# Patient Record
Sex: MALE | Race: WHITE | NOT HISPANIC OR LATINO | Employment: FULL TIME | URBAN - METROPOLITAN AREA
[De-identification: names, ages, dates, MRNs, and addresses within clinical notes are randomized per-mention and may not be internally consistent; named-entity substitution may affect disease eponyms.]

---

## 2024-03-11 ENCOUNTER — APPOINTMENT (OUTPATIENT)
Dept: ORTHOPEDIC SURGERY | Facility: CLINIC | Age: 24
End: 2024-03-11
Payer: COMMERCIAL

## 2024-03-15 ENCOUNTER — OFFICE VISIT (OUTPATIENT)
Dept: ORTHOPEDIC SURGERY | Facility: CLINIC | Age: 24
End: 2024-03-15
Payer: COMMERCIAL

## 2024-03-15 ENCOUNTER — HOSPITAL ENCOUNTER (OUTPATIENT)
Dept: RADIOLOGY | Facility: CLINIC | Age: 24
Discharge: HOME | End: 2024-03-15
Payer: COMMERCIAL

## 2024-03-15 DIAGNOSIS — M79.642 LEFT HAND PAIN: ICD-10-CM

## 2024-03-15 DIAGNOSIS — S62.012A DISPLACED FRACTURE OF DISTAL POLE OF NAVICULAR (SCAPHOID) BONE OF LEFT WRIST, INITIAL ENCOUNTER FOR CLOSED FRACTURE: ICD-10-CM

## 2024-03-15 PROCEDURE — 99214 OFFICE O/P EST MOD 30 MIN: CPT | Performed by: STUDENT IN AN ORGANIZED HEALTH CARE EDUCATION/TRAINING PROGRAM

## 2024-03-15 PROCEDURE — 2500000004 HC RX 250 GENERAL PHARMACY W/ HCPCS (ALT 636 FOR OP/ED): Performed by: STUDENT IN AN ORGANIZED HEALTH CARE EDUCATION/TRAINING PROGRAM

## 2024-03-15 PROCEDURE — 73130 X-RAY EXAM OF HAND: CPT | Mod: LEFT SIDE | Performed by: STUDENT IN AN ORGANIZED HEALTH CARE EDUCATION/TRAINING PROGRAM

## 2024-03-15 PROCEDURE — 2500000005 HC RX 250 GENERAL PHARMACY W/O HCPCS: Performed by: STUDENT IN AN ORGANIZED HEALTH CARE EDUCATION/TRAINING PROGRAM

## 2024-03-15 PROCEDURE — 20550 NJX 1 TENDON SHEATH/LIGAMENT: CPT | Performed by: STUDENT IN AN ORGANIZED HEALTH CARE EDUCATION/TRAINING PROGRAM

## 2024-03-15 PROCEDURE — 99204 OFFICE O/P NEW MOD 45 MIN: CPT | Performed by: STUDENT IN AN ORGANIZED HEALTH CARE EDUCATION/TRAINING PROGRAM

## 2024-03-15 PROCEDURE — 73130 X-RAY EXAM OF HAND: CPT | Mod: LT

## 2024-03-15 RX ORDER — LIDOCAINE HYDROCHLORIDE 10 MG/ML
0.5 INJECTION INFILTRATION; PERINEURAL
Status: COMPLETED | OUTPATIENT
Start: 2024-03-15 | End: 2024-03-15

## 2024-03-15 RX ADMIN — LIDOCAINE HYDROCHLORIDE 0.5 ML: 10 INJECTION, SOLUTION INFILTRATION; PERINEURAL at 16:16

## 2024-03-15 RX ADMIN — TRIAMCINOLONE ACETONIDE 5 MG: 10 INJECTION, SUSPENSION INTRA-ARTICULAR; INTRALESIONAL at 16:16

## 2024-03-15 NOTE — PROGRESS NOTES
History of Present Illness:  Presents for evaluation of left hand pain.  He sustained scaphoid fracture 2 and half months ago after fall on outstretched hand.  This was found on MRI 2 months ago-he is trying to get copy of CD for us..  He has been in a brace until 1 week ago.  Presents for second opinion.  He does have some radial sided continued pain but today complains more ulnar-sided pain.  Particularly over the base of the fifth metacarpal.  This radiates up the ulnar side of his arm.  Denies numbness or tingling throughout the hand.     Review of Systems   GENERAL: Negative for malaise, significant weight loss, fever  MUSCULOSKELETAL: see HPI  NEURO:  Negative    The patient's past medical history, family history, social history, and review of systems were reviewed. History is otherwise negative except as stated in the HPI.    Physical Examination:  General: Alert and oriented to person, place, and time.  No acute distress and breathing comfortably: Pleasant and cooperative with examination.  HEENT: Head is normocephalic and atraumatic.  Neck: Supple, no visible swelling.  Cardiovascular: No palpable tachycardia  Lungs: No audible wheezing or labored breathing  Abdomen: Nondistended.    On musculoskeletal examination,   Able to make a full fist.  Able to extend all of his digits.  No tenderness palpation over scaphoid tubercle.  Mild tenderness palpation over the snuffbox.  No tenderness over the CMC or remainder of bony prominences.  He does have tenderness palpation over the ECU insertion.  No tenderness over the fovea.  No ulnar ballottement.  No tenderness over triquetrum.  No tenderness over the pisiform.  Sensation tact to light touch throughout.    Imaging:  Radiographic notes: AP lateral and oblique of the hand demonstrates no acute osseous process.  Unable to appreciate significant callus formation in the scaphoid.  Unable to appreciate prior fracture.    Hand / UE Inj/Asp: L extensor compartment 6  for tendonitis on 3/15/2024 4:16 PM  Indications: pain  Details: 24 G needle, volar approach  Medications: 5 mg triamcinolone acetonide 10 mg/mL; 0.5 mL lidocaine 10 mg/mL (1 %)  Procedure, treatment alternatives, risks and benefits explained, specific risks discussed. Consent was given by the patient. Immediately prior to procedure a time out was called to verify the correct patient, procedure, equipment, support staff and site/side marked as required.             Assessment:  Patient with healed left scaphoid fracture on x-ray.  He is trying to maintain copy of MRI for us to evaluate.  Today complaining of ulnar-sided pain and ECU tendinitis like symptoms.  Discussed this at length.  He is interested in an injection today.    Plan:  Injection.  I explained the risks and benefits of an injection. Specifically, I reviewed the risks of injection, which include, but are not limited to, infection, bleeding, nerve injury, pain, steroid flare, glycemic alteration, subcutaneous fat atrophy, skin hypopigmentation, soft tissue damage, and incomplete symptom relief. At this time, the patient would like to proceed with an injection. After obtaining consent, I injected a 1mL combination of Kenalog and 1% lidocaine into left 6 dorsal compartment, sterile technique. The injection site was dressed, and the patient tolerated the injection well. I am hopeful that this injection will serve diagnostic, prognostic, and therapeutic purposes. Finally, I have emphasized patience, as any benefit may take several weeks to manifest. Depending on the success of the injection, I will see them back 1 month            Follow up: 1 month    Sonia Dowd MD

## 2024-04-03 ENCOUNTER — APPOINTMENT (OUTPATIENT)
Dept: ORTHOPEDIC SURGERY | Facility: CLINIC | Age: 24
End: 2024-04-03
Payer: COMMERCIAL

## 2024-04-12 ENCOUNTER — APPOINTMENT (OUTPATIENT)
Dept: ORTHOPEDIC SURGERY | Facility: CLINIC | Age: 24
End: 2024-04-12
Payer: COMMERCIAL

## 2024-04-15 ENCOUNTER — APPOINTMENT (OUTPATIENT)
Dept: ORTHOPEDIC SURGERY | Facility: CLINIC | Age: 24
End: 2024-04-15
Payer: COMMERCIAL

## 2024-04-22 ENCOUNTER — OFFICE VISIT (OUTPATIENT)
Dept: ORTHOPEDIC SURGERY | Facility: CLINIC | Age: 24
End: 2024-04-22
Payer: COMMERCIAL

## 2024-04-22 ENCOUNTER — HOSPITAL ENCOUNTER (OUTPATIENT)
Dept: RADIOLOGY | Facility: CLINIC | Age: 24
Discharge: HOME | End: 2024-04-22
Payer: COMMERCIAL

## 2024-04-22 DIAGNOSIS — S62.012A DISPLACED FRACTURE OF DISTAL POLE OF NAVICULAR (SCAPHOID) BONE OF LEFT WRIST, INITIAL ENCOUNTER FOR CLOSED FRACTURE: Primary | ICD-10-CM

## 2024-04-22 DIAGNOSIS — S62.012A DISPLACED FRACTURE OF DISTAL POLE OF NAVICULAR (SCAPHOID) BONE OF LEFT WRIST, INITIAL ENCOUNTER FOR CLOSED FRACTURE: ICD-10-CM

## 2024-04-22 PROCEDURE — 99213 OFFICE O/P EST LOW 20 MIN: CPT | Performed by: STUDENT IN AN ORGANIZED HEALTH CARE EDUCATION/TRAINING PROGRAM

## 2024-04-22 PROCEDURE — 73110 X-RAY EXAM OF WRIST: CPT | Mod: LEFT SIDE | Performed by: STUDENT IN AN ORGANIZED HEALTH CARE EDUCATION/TRAINING PROGRAM

## 2024-04-22 PROCEDURE — 73110 X-RAY EXAM OF WRIST: CPT | Mod: LT

## 2024-04-22 NOTE — PROGRESS NOTES
History of Present Illness  Patient returns today for evaluation of left wrist and hand pain.  Originally sustained scaphoid fracture in January and was immobilized for approximately 2 months.  MRI from February revealed nondisplaced distal scaphoid fracture.  We have report but unfortunately unable to view scans.  Previously seen a month ago with ulnar-sided pain.  Received injection into the ECU with improvement.  Now complaining of persistent radial sided pain..     Physical Examination:  Left upper extremity:  The patient appears to be their stated age, is in no apparent distress, and is oriented x3. The patients mood and affect are appropriate. The patients gait is normal. The examination of the limb in question was performed in comparison to the contralateral limb.    On musculoskeletal examination, tenderness to palpation over the snuffbox and scaphoid tubercle    Sensation and motor function are intact in the radial, and median nerve distribution. There is no obvious thenar atrophy, and thenar strength is 5/5. There is no intrinsic atrophy, and intrinsic strength is 5/5.  The patient can make a full composite fist. The hand itself is warm and well perfused. The skin is intact throughout. The contralateral hand/wrist are normal to inspection, range of motion, stability, and strength.    Radiographs  Demonstrate a left wrist with sclerotic lesion in the waist and possible cyst formation.  Unable to appreciate significant callus formation in the scaphoid or prior fracture.    Assessment:  Patient with persistent left wrist pain following scaphoid fracture in January.    Plan:   I would like to get a CAT scan to evaluate healing of his scaphoid.  Discussed that with pain this far out we need to rule out a nonunion.  I will see him back after CAT scan.    Sonia Diehl MD

## 2024-05-07 ENCOUNTER — APPOINTMENT (OUTPATIENT)
Dept: RADIOLOGY | Facility: CLINIC | Age: 24
End: 2024-05-07
Payer: COMMERCIAL

## 2024-05-10 ENCOUNTER — HOSPITAL ENCOUNTER (OUTPATIENT)
Dept: RADIOLOGY | Facility: CLINIC | Age: 24
Discharge: HOME | End: 2024-05-10
Payer: COMMERCIAL

## 2024-05-10 DIAGNOSIS — S62.012A DISPLACED FRACTURE OF DISTAL POLE OF NAVICULAR (SCAPHOID) BONE OF LEFT WRIST, INITIAL ENCOUNTER FOR CLOSED FRACTURE: ICD-10-CM

## 2024-05-10 PROCEDURE — 73200 CT UPPER EXTREMITY W/O DYE: CPT | Mod: LEFT SIDE | Performed by: RADIOLOGY

## 2024-05-10 PROCEDURE — 73200 CT UPPER EXTREMITY W/O DYE: CPT | Mod: LT

## 2024-05-17 ENCOUNTER — APPOINTMENT (OUTPATIENT)
Dept: RADIOLOGY | Facility: CLINIC | Age: 24
End: 2024-05-17
Payer: COMMERCIAL

## 2024-05-21 ENCOUNTER — OFFICE VISIT (OUTPATIENT)
Dept: ORTHOPEDIC SURGERY | Facility: CLINIC | Age: 24
End: 2024-05-21
Payer: COMMERCIAL

## 2024-05-21 DIAGNOSIS — M25.532 LEFT WRIST PAIN: Primary | ICD-10-CM

## 2024-05-21 PROBLEM — Q74.0 OTHER CONGENITAL MALFORMATIONS OF UPPER LIMB(S), INCLUDING SHOULDER GIRDLE: Status: ACTIVE | Noted: 2024-05-21

## 2024-05-21 PROCEDURE — 99214 OFFICE O/P EST MOD 30 MIN: CPT | Performed by: STUDENT IN AN ORGANIZED HEALTH CARE EDUCATION/TRAINING PROGRAM

## 2024-05-21 NOTE — PROGRESS NOTES
History of Present Illness  Patient returns today for evaluation of left wrist.  Did obtain CAT scan in the interim which shows an accessory ossicle present dorsally between the scaphoid capitate and trapezoid.  Interestingly this is in location of where prior distal scaphoid fracture was previously seen.  Unfortunately we are still unable to see MRI disc.    States that pain today is mostly radially based and worse with wrist extension.  He wears a wrist brace intermittently without considerable improvement    Physical Examination:  Left upper extremity:  The patient appears to be their stated age, is in no apparent distress, and is oriented x3. The patients mood and affect are appropriate. The patients gait is normal. The examination of the limb in question was performed in comparison to the contralateral limb.    On musculoskeletal examination, tenderness to palpation over the distal scaphoid.  This appears to correlate to his ossicle present between the scaphoid capitate and trapezoid.    Sensation and motor function are intact in the radial, and median nerve distribution. There is no obvious thenar atrophy, and thenar strength is 5/5. There is no intrinsic atrophy, and intrinsic strength is 5/5.  The patient can make a full composite fist. The hand itself is warm and well perfused. The skin is intact throughout. The contralateral hand/wrist are normal to inspection, range of motion, stability, and strength.    Ctn Bony structures:  An approximate 6 mm ossification is seen dorsally  between the scaphoid, capitate and trapezoid, best on image 21 of  series 205. This is felt to be due the accessory ossicle, os centrale  carpi as the cortical margins of the bony structures are well  defined. The bony structures otherwise appear intact without acute  fracture.    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  Interesting in that this fall in January could have caused inflammation surrounding  the ossicle.  CT without evidence of prior scaphoid fracture.  It is possible that this was previously asymptomatic but with fall became symptomatic.  We discussed options.  We could perform cortisone injection around the area.  As this looks like an accessory ossicle I believe this is present prior to the fall.  A cortisone injection could help us reduce inflammation and reduce pain.  He is very symptomatic and now knowing that this is here would like this to be removed.  We discussed risk benefits and alternatives of excision and he would like to proceed with excision.  Discussed that following this we would place him into a volar resting brace for 4 weeks and then get him into occupational therapy.    Plan:   At this point, I discussed the risks/benefits/expected outcomes of the three treatment options: Injection versus observation vs surgery. The patient has elected to proceed with surgery. The risks/benefits of surgery were reviewed. The risks include, but are not limited to, infection, bleeding, nerve/vessel injury, recurrence, stiffness, and anaesthetic complications. I have answered all questions regarding the surgery itself and the post-operative course. The patient consented to surgery and will be scheduled in the near future.      Sonia Diehl MD

## 2024-05-29 NOTE — PREPROCEDURE INSTRUCTIONS
Patient and nurse discussed pre-operative instructions of the location of procedure, NPO status, home medications, and what to aspect after procedure.  Patient is aware to not smoke nicotine products, drink alcohol, or do any drugs within 24hrs of procedure.  Not to bring any valuables and to not wear any metal, jewelry, piercing's or beauty products for surgery.   Informed about the call the business day prior to procedure that will be give the exact time of arrival on the day of surgery, between 2PM-4PM.  Any questions call the surgeons office, and any questions about Pre-Admission Testing call [phone]

## 2024-05-30 ENCOUNTER — ANESTHESIA EVENT (OUTPATIENT)
Dept: OPERATING ROOM | Facility: HOSPITAL | Age: 24
End: 2024-05-30
Payer: COMMERCIAL

## 2024-05-30 RX ORDER — OXYCODONE AND ACETAMINOPHEN 5; 325 MG/1; MG/1
1 TABLET ORAL EVERY 6 HOURS PRN
Qty: 12 TABLET | Refills: 0 | Status: SHIPPED | OUTPATIENT
Start: 2024-05-30 | End: 2024-06-04

## 2024-05-30 RX ORDER — IBUPROFEN 800 MG/1
800 TABLET ORAL 3 TIMES DAILY
Qty: 15 TABLET | Refills: 0 | Status: SHIPPED | OUTPATIENT
Start: 2024-05-30 | End: 2024-06-04

## 2024-05-30 NOTE — DISCHARGE INSTRUCTIONS
Moderate Sedation in Adults Discharge Instructions    About this topic  Moderate sedation is also known as conscious sedation. It changes your state of being awake or consciousness. With this sedation, you may feel slight pain or pressure during a procedure. The drugs help you to relax and may even allow you to sleep. It will be easy to wake you and you may talk and answer questions while under sedation. Most likely, you will not remember what happens while under this sedation.  What care is needed at home?  Ask your doctor what you need to do when you go home. Make sure you understand everything the doctor says. This way you will know what you need to do.  You will not be allowed to drive right away after the procedure. Ask a family member or a friend to drive you home.  Do not operate heavy or dangerous machinery for at least 24 hours.  Do not make major decisions or sign important papers for at least 24 hours. You may not be thinking clearly.  Avoid beer, wine, or mixed drinks (alcohol) for at least 24 hours.  You are at a higher risk of falling for at least 24 hours after moderate sedation.  Take extra care when you get up.  Do not change positions quickly.  Do not rush when you need to go to the bathroom or to answer the phone.  Ask for help if you feel unsteady when you try to walk.  Wear shoes with non-slip soles and low heels.  What follow-up care is needed?  Your doctor may ask you to make visits to the office to check on your progress. Be sure to keep these visits. Your doctor may also refer you to other doctors or tell you that you need more tests or care.  What drugs may be needed?  The doctor may order drugs to:  Help with pain  Treat an upset stomach or throwing up  Will physical activity be limited?  Rest for the day of the procedure. Avoid strenuous activities like heavy lifting and hard exercise. Talk to your doctor about whether you need to limit lifting or exercise after your procedure.  What  changes to diet are needed?  Start with a light diet when you are fully awake. This includes things that are easy to swallow like soups, pudding, jello, toast, and eggs. Slowly progress to your normal diet.  What problems could happen?  Low blood pressure  Breathing problems  Upset stomach or throwing up  Dizziness  When do I need to call the doctor?  Feel dizzy, weak, or tired  Faint  Very bad headache  Upset stomach or throwing up  To follow up for more tests or care  Teach Back: Helping You Understand  The Teach Back Method helps you understand the information we are giving you. After you talk with the staff, tell them in your own words what you learned. This helps to make sure the staff has described each thing clearly. It also helps to explain things that may have been confusing. Before going home, make sure you can do these:  I can tell you about my procedure.  I can tell you if I need more tests or care.  I can tell you what is good for me to eat and drink the next day.  I can tell you what I would do if I feel dizzy, weak, or tired.  Last Reviewed Date  2020-03-02     Nerve Blocks    Why is this procedure done?  Nerve blocks can help to manage pain. By giving you a drug into an exact group of nerves, your doctor may be able to block pain to a specific part of your body. Some nerve blocks are used after surgery, especially if you have had an abdominal surgery. Nerve blocks are used before surgery to lessen the need for opioid drugs during and after surgery.  There are a few kinds of nerve blocks. Some nerve blocks are used to:  Treat pain. These may have a pain drug and a drug to help with swelling.  Find where your pain is coming from. This kind of nerve block will have a pain drug that lasts for only a certain amount of time.  See if another kind of treatment like surgery will help your pain.  Prevent pain during or after a procedure.  Help you avoid surgery.  Give relief of pain during and after  surgery.  Lessen the use of opioid drugs needed for pain after surgery.  Block the pain in an area of the body during surgery as anesthesia.  What will the results be?  The nerve block may help to treat or ease your pain. The area may be numb. You may have some pain relief right away. You may be able to use fewer pain medicines after surgery. It also may be easier for you to move around after surgery. Some nerve blocks go away within a few hours. Others give you pain relief for a day or so to a few months or longer. Some nerve blocks can take a few days to work fully.  What happens before the procedure?  Your doctor will ask you about your health history. Talk to the doctor about:  All the drugs you are taking. Be sure to include all prescription, over the counter, and herbal supplements. Tell the doctor if you have any drug allergy. Bring a list of drugs you take with you.  Any bleeding problems. Be sure to tell your doctor if you are taking any drugs that may cause bleeding. Some of these are warfarin, rivaroxaban, apixaban, ticagrelor, clopidogrel, ibuprofen, naproxen, or aspirin. Certain vitamins and herbs, such as garlic and fish oil, may also add to the risk for bleeding. You may need to stop these drugs as well. Talk to your doctor about them.  What happens during the procedure?  Sometimes, the doctor will give you a special drug to make you sleepy for the nerve block. Other times, you are completely awake. You may also have a nerve block as a part of your surgery.  The doctor will position you in a way to give them easy access to where you will be having the nerve block.  The doctor will clean the area and give you a local numbing drug. The doctor will use a long thin needle to give you the nerve block. Often, the doctor will use a special x-ray, ultrasound, or CT scan to make sure the needle is in the right place. The doctor will inject the drug close to the nerve that is causing your pain. Sometimes they  inject the drug in an area that will block pain from a few nerves.  The doctor will take out the needle and place a clean bandage on your skin.  Sometimes, the doctor may leave a catheter in place to deliver medicine over 1 to 2 days. You may have to return to your doctor's office to have the catheter removed.  The procedure takes 15 to 30 minutes.  What happens after the procedure?  If you are not having surgery, you will be able to go home the same day. You may be asked to rest for 15 to 30 minutes. If the doctor gives you a special drug to make you sleepy for the procedure, you will need someone to drive you home.  What care is needed at home?  You will be allowed to shower or take a bath later that same day unless you have a catheter still in place.  You may need other medicines to help with pain as your nerve block wears off.  What follow-up care is needed?  As your body absorbs the drugs, your pain may come back. Talk to your doctor about if you need another nerve block and how often you can have a nerve block.  What problems could happen?  Infection  Bleeding or bruising  Pain at the injection site  Raised blood sugar  Rash  Itching  Numbness  Nerve injury  Allergic reaction  Puncture or laceration of an organ like the liver, spleen. or bowel  Numbing medicine injected into a blood vessel  Where can I learn more?  American Society of Regional Anesthesia  https://www.kathleen.com/patient-information/regional-anesthesia  NHS  https://www.nhs.uk/conditions/epidural/  [facility]  https://www.nhs.uk/conditions/local-anaesthesia/  [employer] Society of [place]  http://www.radiologyinfo.org/en/info.cfm?pg=nerveblock  Last Reviewed Date  2020-04-22    Dr. Diehl Upper Extremity SURGERY  Post Operative Instructions   Sonia Diehl MD     Dressing  You have a bulky dressing on your arm.        Do NOT remove dressing until next appointment with Dr. Diehl or Occupational Therapy (OT). Please call Dr. Diehl's office if  an appointment is not already arranged.  __________________________________________________________________________________________________________    If you experience persistent numbness, tingling or burning sensation in your hand, it may be due to a cast or surgical dressing that is too tight. Keep your hand elevated, and if this does not resolve the problem, call your doctor immediately.    If your dressing, splint, or cast gets wet, contact your physician’s office.     Activity  If pain will allow, try to flex and extend the fingers on the operated hand.  The dressing and swelling may cause the fingers to be stiff and this is common.  If the fingers turn blue, purple, or remain numb after the block has worn off, call the office immediately.      Showering  You may shower as soon as you want after surgery. Please cover the dressing with a bag.    If you are allowed to remove your dressing, you may shower and get the wound wet after you have been told it is safe to remove your dressing.  You may allow the water to run over the incisions and pat the incision dry. DO NOT let the wound soak in a tub, pool, or dish water. If you are using a band aid to cover your incision after a shower, make sure the incision is completely dry.    Ice & Elevate  The use of ice on your arm/hand after surgery will help with both pain control and swelling.  Continue with icing your arm/hand for the first 48 hours after surgery.  It can take a while for the coolness of the ice to get through the splint/cast, but be patient, it will work. Thereafter, use it on an as needed basis.    Elevate your hand above your heart level as much as possible for the first 48 hours, even while walking.  This will keep the swelling to a minimum and prevent throbbing and pain. Elevation reduces swelling and minimizes pain. Less swelling is associated with a lower infection rate, fewer wound complications, less post-operative stiffness, and more rapid  recovery of function. To keep the swelling down, your hand must be kept above the level of your heart.     One common mistake people make is they will put pillows or blankets under their elbow while sitting or laying down. Please always keep the hand above the elbow and move your fingers as much as possible! Swelling tends to collect in the lowest part of the body so if your hand is below the rest of your arm, your fingers and hand will swell and become stiff.    Pain Medication  If you received a regional anesthetic block your arm and hand may be numb for several hours (6-24 hours).  You will be discharged from the surgery center to home with an oral pain medication (analgesic).  Rest and elevation is still one of the most important factors for pain control. Take your pain medication as directed even though the pain is minimal with the block; do not wait for the pain to become out of control.  The most severe pain occurs as the block wears off.  Even if you are not having pain but feel the arm and hand “waking up”, take your pain medication so that it will be working when needed.    DO NOT drink alcoholic beverages or smoke while taking your pain medications. DO NOT drive a car or other vehicle or operate any machinery while under the influence of your medication.    It is important NOT TO WAIT until your pain is severe before taking your medication since the medications often take an hour or longer before you will begin to feel some relief. Take the medication as soon as you experience even mild pain the first night after surgery. Usually by the second or third day after surgery your upper extremity will begin to feel better and you will require much less pain medication.    **Prescription refills will only be addressed during normal business hours.  Narcotic refills will not be addressed after hours or on weekends as the physician on call does not have access to your medical records.**    Side Effects: All pain  medications can cause nausea, vomiting, and/or constipation. It is best to take pain medication with food. If these problems become significant, please contact our office. Have a phone number for your pharmacy available.    Diet  Eat light the day of surgery and resume normal diet tomorrow. Increase your fluid intake due to pain medications    Driving  It is not advisable to drive a vehicle while you are on pain medication, due to the possible side effects.  However, once you are off pain medication and you feel that you are able to safely control the vehicle, you may drive.    Warning Signs  Fever: A low-grade fever (less than 101 degrees) following surgery and lasting for several days is quite common. You may even have some slight chills and sweating. If you have a low-grade fever you should make an effort to cough and breathe deeply to clear congestion from your lungs.     After hospital discharge, call your physician if you experience:  Increasing or foul smelling drainage (after the first 24 hours)   Increasing redness and/or pain to surgery site.  As well as new onset fevers and or chills.  These could signify an infection.    If any of the above occurs, please notify Dr. Diehl's office.      Follow-up Appointments    We are interested in your prompt and complete recovery from surgery. If you have any problems or questions concerning your recovery, please call your doctor’s office.  Your doctor’s office can be called Monday --Friday, 8:00 am to 5:00 pm.     You should already have an appointment to see Dr. Diehl within the next few weeks.  If you do not have this appointment, or need to change your appointment time, please contact our office.     Do not hesitate to call with any questions or concerns.      Dr. Diehl's office numbers are:    1) During normal business hours, 8AM to 5PM Monday through Friday, please call: [phone]  2) After hour emergencies can be directed to the physician on call at  [phone]                                        Frequently Asked Questions    Patients often have similar questions after surgery. To help reduce unnecessary worry and stress after your surgery, we have answered some of your commonly asked questions.    What should I do if I see blood on my bandages?  Many patients bleed through their bandages after surgery. Do not let this alarm you. Please do not remove the initial bandage since this it is sterile and we want to maintain cleanliness around the wound until we change the dressing.    When can I shower or bathe?  Often we will keep a snug compression bandage on your arm for several weeks after surgery. You should not get this bandage wet. If the bandage should become wet, it will need to be changed. Keep your arm dry until the bandage is removed for good. Therefore, sponge baths are the recommended body cleansing method. Information regarding shower cast protectors is available at your surgeon’s office. Glad brand Press'n Seal is also a good, lower cost option for keeping your dressing dry while bathing.    What should I do if the bandages come off?  The bandages are placed in a very specific fashion. If the bandages are removed or come off, please place a sterile gauze wrap over the incisions. We should see you in the office the following day to replace your bandage.    When will the numbness that I feel in my hand wear off?  We usually numb your arm during surgery. Sometimes it takes up to 24 hours for the numbness to go away. You may continue to have some numbness in your fingers after this time because the nerves can be slightly bruised during surgery.    What do I do about swelling around my fingers and behind my bandage?  All patients have a significant amount of swelling around their bandage and hand after surgery. This swelling will last for several months. After surgery you should elevate your hand higher than your heart to decrease swelling. This is  particularly critical for several days after surgery while your incision is healing.  Once the wound is healed, the swelling will not harm your surgery. If you are concerned about a significant amount of swelling or redness, please call for an appointment. You should also try to move your free fingers (not those in the dressing) as much as possible to avoid stiffness and swelling.    How can I tell if an infection is developing in my upper extremity?  Many patients will have a slight drainage of yellowish fluid for 1 to 2 weeks after surgery. This does not mean that your hand/arm is infected. Severe wound infections usually occur from 5 to 10 days after surgery. If you notice extreme swelling, increased pain, or extreme redness, please contact us immediately. Usually if we treat an infection early with antibiotics, future surgery can be avoided.    What should I do if I experience nausea or vomiting due to the pain medications I am taking?  Many patients have nausea after surgery when taking pain medications. If the nausea and vomiting are severe, we will need to prescribe medication in an oral dissolving or suppository form.    When will the stitches be removed?  We usually remove the stitches at approximately 1 to 2 weeks after surgery. If you miss your appointment because of an emergency, do not be alarmed because the stitches can remain in place for many weeks without causing harm.    My arm itches under the cast/splint, is there anything I can do?  Whatever you do, do NOT stick any objects under your dressing to scratch your arm! I have seen pen caps and other objects get stuck under the cast and the patient is too embarrassed to come in, so we discover the pen cap or other items under the cast a few weeks later and it has dug into the skin making an ulcer/wound in the patient's skin. Using ice packs on your arm (the cold takes a long time to penetrate the thick dressing) or a hair dryer set to the cool setting  to blow cool air down the splint/cast to help alleviate itching.

## 2024-05-31 ENCOUNTER — ANESTHESIA (OUTPATIENT)
Dept: OPERATING ROOM | Facility: HOSPITAL | Age: 24
End: 2024-05-31
Payer: COMMERCIAL

## 2024-05-31 ENCOUNTER — APPOINTMENT (OUTPATIENT)
Dept: RADIOLOGY | Facility: HOSPITAL | Age: 24
End: 2024-05-31
Payer: COMMERCIAL

## 2024-05-31 ENCOUNTER — HOSPITAL ENCOUNTER (OUTPATIENT)
Facility: HOSPITAL | Age: 24
Setting detail: OUTPATIENT SURGERY
Discharge: HOME | End: 2024-05-31
Attending: STUDENT IN AN ORGANIZED HEALTH CARE EDUCATION/TRAINING PROGRAM | Admitting: STUDENT IN AN ORGANIZED HEALTH CARE EDUCATION/TRAINING PROGRAM
Payer: COMMERCIAL

## 2024-05-31 VITALS
WEIGHT: 164.68 LBS | SYSTOLIC BLOOD PRESSURE: 128 MMHG | TEMPERATURE: 97 F | OXYGEN SATURATION: 99 % | DIASTOLIC BLOOD PRESSURE: 74 MMHG | RESPIRATION RATE: 16 BRPM | HEART RATE: 73 BPM | HEIGHT: 69 IN | BODY MASS INDEX: 24.39 KG/M2

## 2024-05-31 DIAGNOSIS — Q74.0 OTHER CONGENITAL MALFORMATIONS OF UPPER LIMB(S), INCLUDING SHOULDER GIRDLE: Primary | ICD-10-CM

## 2024-05-31 PROCEDURE — A4217 STERILE WATER/SALINE, 500 ML: HCPCS | Performed by: STUDENT IN AN ORGANIZED HEALTH CARE EDUCATION/TRAINING PROGRAM

## 2024-05-31 PROCEDURE — 3600000009 HC OR TIME - EACH INCREMENTAL 1 MINUTE - PROCEDURE LEVEL FOUR: Performed by: STUDENT IN AN ORGANIZED HEALTH CARE EDUCATION/TRAINING PROGRAM

## 2024-05-31 PROCEDURE — 2500000004 HC RX 250 GENERAL PHARMACY W/ HCPCS (ALT 636 FOR OP/ED): Performed by: ANESTHESIOLOGY

## 2024-05-31 PROCEDURE — 2500000004 HC RX 250 GENERAL PHARMACY W/ HCPCS (ALT 636 FOR OP/ED): Performed by: STUDENT IN AN ORGANIZED HEALTH CARE EDUCATION/TRAINING PROGRAM

## 2024-05-31 PROCEDURE — 2500000004 HC RX 250 GENERAL PHARMACY W/ HCPCS (ALT 636 FOR OP/ED): Mod: JZ | Performed by: STUDENT IN AN ORGANIZED HEALTH CARE EDUCATION/TRAINING PROGRAM

## 2024-05-31 PROCEDURE — 3700000001 HC GENERAL ANESTHESIA TIME - INITIAL BASE CHARGE: Performed by: STUDENT IN AN ORGANIZED HEALTH CARE EDUCATION/TRAINING PROGRAM

## 2024-05-31 PROCEDURE — 25210 REMOVAL OF WRIST BONE: CPT | Performed by: STUDENT IN AN ORGANIZED HEALTH CARE EDUCATION/TRAINING PROGRAM

## 2024-05-31 PROCEDURE — 2500000005 HC RX 250 GENERAL PHARMACY W/O HCPCS: Performed by: NURSE ANESTHETIST, CERTIFIED REGISTERED

## 2024-05-31 PROCEDURE — 3600000004 HC OR TIME - INITIAL BASE CHARGE - PROCEDURE LEVEL FOUR: Performed by: STUDENT IN AN ORGANIZED HEALTH CARE EDUCATION/TRAINING PROGRAM

## 2024-05-31 PROCEDURE — 2500000004 HC RX 250 GENERAL PHARMACY W/ HCPCS (ALT 636 FOR OP/ED): Performed by: NURSE ANESTHETIST, CERTIFIED REGISTERED

## 2024-05-31 PROCEDURE — 7100000009 HC PHASE TWO TIME - INITIAL BASE CHARGE: Performed by: STUDENT IN AN ORGANIZED HEALTH CARE EDUCATION/TRAINING PROGRAM

## 2024-05-31 PROCEDURE — 7100000010 HC PHASE TWO TIME - EACH INCREMENTAL 1 MINUTE: Performed by: STUDENT IN AN ORGANIZED HEALTH CARE EDUCATION/TRAINING PROGRAM

## 2024-05-31 PROCEDURE — 3700000002 HC GENERAL ANESTHESIA TIME - EACH INCREMENTAL 1 MINUTE: Performed by: STUDENT IN AN ORGANIZED HEALTH CARE EDUCATION/TRAINING PROGRAM

## 2024-05-31 RX ORDER — LABETALOL HYDROCHLORIDE 5 MG/ML
5 INJECTION, SOLUTION INTRAVENOUS ONCE AS NEEDED
Status: CANCELLED | OUTPATIENT
Start: 2024-05-31

## 2024-05-31 RX ORDER — FENTANYL CITRATE 50 UG/ML
INJECTION, SOLUTION INTRAMUSCULAR; INTRAVENOUS AS NEEDED
Status: DISCONTINUED | OUTPATIENT
Start: 2024-05-31 | End: 2024-05-31

## 2024-05-31 RX ORDER — MIDAZOLAM HYDROCHLORIDE 1 MG/ML
INJECTION, SOLUTION INTRAMUSCULAR; INTRAVENOUS AS NEEDED
Status: DISCONTINUED | OUTPATIENT
Start: 2024-05-31 | End: 2024-05-31

## 2024-05-31 RX ORDER — PROPOFOL 10 MG/ML
INJECTION, EMULSION INTRAVENOUS AS NEEDED
Status: DISCONTINUED | OUTPATIENT
Start: 2024-05-31 | End: 2024-05-31

## 2024-05-31 RX ORDER — MIDAZOLAM HYDROCHLORIDE 1 MG/ML
1 INJECTION, SOLUTION INTRAMUSCULAR; INTRAVENOUS ONCE AS NEEDED
Status: CANCELLED | OUTPATIENT
Start: 2024-05-31

## 2024-05-31 RX ORDER — LIDOCAINE HYDROCHLORIDE 10 MG/ML
0.1 INJECTION, SOLUTION EPIDURAL; INFILTRATION; INTRACAUDAL; PERINEURAL ONCE
Status: CANCELLED | OUTPATIENT
Start: 2024-05-31 | End: 2024-05-31

## 2024-05-31 RX ORDER — SODIUM CHLORIDE, SODIUM LACTATE, POTASSIUM CHLORIDE, CALCIUM CHLORIDE 600; 310; 30; 20 MG/100ML; MG/100ML; MG/100ML; MG/100ML
100 INJECTION, SOLUTION INTRAVENOUS CONTINUOUS
Status: CANCELLED | OUTPATIENT
Start: 2024-05-31

## 2024-05-31 RX ORDER — LIDOCAINE HYDROCHLORIDE 20 MG/ML
INJECTION, SOLUTION EPIDURAL; INFILTRATION; INTRACAUDAL; PERINEURAL AS NEEDED
Status: DISCONTINUED | OUTPATIENT
Start: 2024-05-31 | End: 2024-05-31

## 2024-05-31 RX ORDER — ALBUTEROL SULFATE 0.83 MG/ML
2.5 SOLUTION RESPIRATORY (INHALATION) ONCE
Status: CANCELLED | OUTPATIENT
Start: 2024-05-31 | End: 2024-05-31

## 2024-05-31 RX ORDER — SODIUM CHLORIDE 0.9 G/100ML
IRRIGANT IRRIGATION AS NEEDED
Status: DISCONTINUED | OUTPATIENT
Start: 2024-05-31 | End: 2024-05-31 | Stop reason: HOSPADM

## 2024-05-31 RX ORDER — OXYCODONE HYDROCHLORIDE 5 MG/1
5 TABLET ORAL EVERY 4 HOURS PRN
Status: CANCELLED | OUTPATIENT
Start: 2024-05-31

## 2024-05-31 RX ORDER — DROPERIDOL 2.5 MG/ML
0.62 INJECTION, SOLUTION INTRAMUSCULAR; INTRAVENOUS ONCE AS NEEDED
Status: CANCELLED | OUTPATIENT
Start: 2024-05-31

## 2024-05-31 RX ORDER — MEPERIDINE HYDROCHLORIDE 25 MG/ML
12.5 INJECTION INTRAMUSCULAR; INTRAVENOUS; SUBCUTANEOUS EVERY 10 MIN PRN
Status: CANCELLED | OUTPATIENT
Start: 2024-05-31

## 2024-05-31 RX ORDER — SODIUM CHLORIDE, SODIUM LACTATE, POTASSIUM CHLORIDE, CALCIUM CHLORIDE 600; 310; 30; 20 MG/100ML; MG/100ML; MG/100ML; MG/100ML
100 INJECTION, SOLUTION INTRAVENOUS CONTINUOUS
Status: DISCONTINUED | OUTPATIENT
Start: 2024-05-31 | End: 2024-05-31 | Stop reason: HOSPADM

## 2024-05-31 RX ORDER — CEFAZOLIN SODIUM 2 G/100ML
2 INJECTION, SOLUTION INTRAVENOUS ONCE
Status: COMPLETED | OUTPATIENT
Start: 2024-05-31 | End: 2024-05-31

## 2024-05-31 RX ADMIN — FENTANYL CITRATE 50 MCG: 50 INJECTION, SOLUTION INTRAMUSCULAR; INTRAVENOUS at 09:56

## 2024-05-31 RX ADMIN — SODIUM CHLORIDE, POTASSIUM CHLORIDE, SODIUM LACTATE AND CALCIUM CHLORIDE 100 ML/HR: 600; 310; 30; 20 INJECTION, SOLUTION INTRAVENOUS at 07:06

## 2024-05-31 RX ADMIN — PROPOFOL 150 MCG/KG/MIN: 10 INJECTION, EMULSION INTRAVENOUS at 09:37

## 2024-05-31 RX ADMIN — SODIUM CHLORIDE, SODIUM LACTATE, POTASSIUM CHLORIDE, AND CALCIUM CHLORIDE: .6; .31; .03; .02 INJECTION, SOLUTION INTRAVENOUS at 09:32

## 2024-05-31 RX ADMIN — PROPOFOL 100 MG: 10 INJECTION, EMULSION INTRAVENOUS at 09:37

## 2024-05-31 RX ADMIN — CEFAZOLIN SODIUM 2 G: 2 INJECTION, SOLUTION INTRAVENOUS at 09:32

## 2024-05-31 RX ADMIN — MIDAZOLAM 4 MG: 1 INJECTION INTRAMUSCULAR; INTRAVENOUS at 08:12

## 2024-05-31 RX ADMIN — ROPIVACAINE HYDROCHLORIDE: 5 INJECTION EPIDURAL; INFILTRATION; PERINEURAL at 08:23

## 2024-05-31 RX ADMIN — FENTANYL CITRATE 50 MCG: 50 INJECTION, SOLUTION INTRAMUSCULAR; INTRAVENOUS at 09:47

## 2024-05-31 RX ADMIN — LIDOCAINE HYDROCHLORIDE 60 MG: 20 INJECTION, SOLUTION EPIDURAL; INFILTRATION; INTRACAUDAL; PERINEURAL at 09:37

## 2024-05-31 SDOH — HEALTH STABILITY: MENTAL HEALTH: CURRENT SMOKER: 0

## 2024-05-31 ASSESSMENT — PAIN DESCRIPTION - DESCRIPTORS
DESCRIPTORS: ACHING
DESCRIPTORS: ACHING;DULL
DESCRIPTORS: ACHING;DULL

## 2024-05-31 ASSESSMENT — COLUMBIA-SUICIDE SEVERITY RATING SCALE - C-SSRS
2. HAVE YOU ACTUALLY HAD ANY THOUGHTS OF KILLING YOURSELF?: NO
6. HAVE YOU EVER DONE ANYTHING, STARTED TO DO ANYTHING, OR PREPARED TO DO ANYTHING TO END YOUR LIFE?: NO
1. IN THE PAST MONTH, HAVE YOU WISHED YOU WERE DEAD OR WISHED YOU COULD GO TO SLEEP AND NOT WAKE UP?: NO

## 2024-05-31 ASSESSMENT — PAIN - FUNCTIONAL ASSESSMENT
PAIN_FUNCTIONAL_ASSESSMENT: 0-10

## 2024-05-31 ASSESSMENT — PAIN SCALES - GENERAL
PAINLEVEL_OUTOF10: 3
PAINLEVEL_OUTOF10: 5 - MODERATE PAIN
PAIN_LEVEL: 0
PAINLEVEL_OUTOF10: 5 - MODERATE PAIN

## 2024-05-31 NOTE — OP NOTE
EXCISION LEFT WRIST OSSICLE (L) Operative Note     Date: 2024  OR Location: [facility]    Name: Kaveh GUTIERREZ, : 2000, Age: 23 y.o., MRN: [id number], Sex: male    Diagnosis  Pre-op Diagnosis     * Accessory wrist ossicle, left Post-op Diagnosis     * accessory wrist ossicle left     Procedures  EXCISION LEFT WRIST OSSICLE  [id number]        Surgeons      * Sonia Dowd - Primary    Resident/Fellow/Other Assistant:  Surgeons and Role:     * Vidal Moreno PA-C - Assisting    Procedure Summary  Anesthesia: Monitor Anesthesia Care  ASA: I  Anesthesia Staff: No anesthesia staff entered.  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 male who is having surgery for accessory wrist ossicle on the left.  Sustained a fall a few months ago and has been having pain in the radial side of his hand following this.  CT revealed accessory ossicle and on exam this was a location of his pain.  We had trialed extensive bracing therapy without considerable improvement and so decision made to remove the accessory ossicle.  Risk benefits alternatives were discussed.  We did discuss that based on this being the dorsal aspect of the scaphoid we would need to avoid the scaphoid blood supply and that there is a potential that this ossicle may not be removed in entirety.  He was in understanding of this and chose to proceed.     The patient was seen in the preoperative area. The risks,  benefits, complications, treatment options, non-operative alternatives, expected recovery and outcomes were discussed with the patient. The possibilities of reaction to medication, pulmonary aspiration, injury to surrounding structures, bleeding, recurrent infection, the need for additional procedures, failure to diagnose a condition, and creating a complication requiring transfusion or operation were discussed with the patient. The patient concurred with the proposed plan, giving informed consent.  The site of surgery was properly noted/marked if necessary per policy. The patient has been actively warmed in preoperative area. Preoperative antibiotics have been ordered and given within 1 hours of incision. Venous thrombosis prophylaxis are not indicated.    Procedure Details: Patient is brought to the operating room and transferred to the OR  table attached.  Hand prepped and draped in standard sterile fashion.  Timeout performed surgical team.  Under fluoroscopy a 18-gauge needle was utilized to localize the dorsal ossification between the scaphoid capitate and trapezoid.  Incision was subsequently made over the 18-gauge needle.  EPL and fourth compartment tendons were protected.  Dorsal wrist capsule was excised over the ossicle and this was removed.  Care was taken to minimize our dorsal capsulotomy due to concern for scaphoid blood supply.  After ossicle was excised imaging under fluoroscopy again obtained.  Copious irrigation performed.  Wound closed in layered fashion.  Volar resting splint applied.  Plan to leave this on for 2 weeks.  And then we will get him into therapy.    Complications:  None; patient tolerated the procedure well.    Disposition: PACU - hemodynamically stable.  Condition: stable         Additional Details: NA    Attending Attestation: I was present and scrubbed for the entire procedure.    Vidal ERAZO was present throughout the entire case. Given the nature of the disease  process and the procedure, a skilled surgical first assistant was necessary during the case. The assistant was necessary to hold retractors and to manipulate the extremity during the procedure. A certified scrub tech was at the back table managing the instruments and supplies for the surgical case.      Sonia Diehl [facility]  Phone Number: [phone]

## 2024-05-31 NOTE — ANESTHESIA PROCEDURE NOTES
Peripheral Block    Patient location during procedure: pre-op  Start time: 5/31/2024 8:12 AM  End time: 5/31/2024 8:23 AM  Reason for block: procedure for pain, at surgeon's request and post-op pain management  Staffing  Performed: attending   Authorized by: Shun Pascal MD    Performed by: Shun Pascal MD  Preanesthetic Checklist  Completed: patient identified, IV checked, site marked, risks and benefits discussed, surgical consent, monitors and equipment checked, pre-op evaluation and timeout performed   Timeout performed at: 5/31/2024 8:12 AM  Peripheral Block  Patient position: laying flat  Prep: ChloraPrep  Patient monitoring: heart rate, cardiac monitor and continuous pulse ox  Injection technique: single-shot  Guidance: ultrasound guided  Local infiltration: ropivacaine  Infiltration strength: 0.5 %  Dose: 20 mL  Needle  Needle type: pencil-tip   Needle gauge: 21 G  Needle localization: ultrasound guidance  Assessment  Injection assessment: negative aspiration for heme, no paresthesia on injection, incremental injection and local visualized surrounding nerve on ultrasound  Slow fractionated injection: yes

## 2024-05-31 NOTE — ANESTHESIA POSTPROCEDURE EVALUATION
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])    Surgeons: Sonia Dowd MD Responsible Provider: Shun Pascal MD    Anesthesia Type: MAC ASA Status: 1            Anesthesia Type: MAC    Vitals Value Taken Time   /62 05/31/24 1016   Temp  05/31/24 1016   Pulse 75 05/31/24 1016   Resp 13 05/31/24 1016   SpO2 96 05/31/24 1016       Anesthesia Post Evaluation    Patient location during evaluation: bedside  Patient participation: complete - patient participated  Level of consciousness: awake and alert  Pain score: 0  Pain management: adequate  Airway patency: patent  Cardiovascular status: acceptable and stable  Respiratory status: acceptable and room air  Hydration status: acceptable  Postoperative Nausea and Vomiting: none        No notable events documented.

## 2024-05-31 NOTE — ANESTHESIA PREPROCEDURE EVALUATION
Patient: Kaveh GUTIERREZ    Procedure Information       Date/Time: 05/31/24 0900    Procedure: EXCISION LEFT WRIST OSSICLE (Left: Wrist) - MAC + REGIONAL BLOCK    Location: [facility]    Surgeons: Sonia Dowd MD            Relevant Problems   Anesthesia (within normal limits)      Cardiac (within normal limits)      Pulmonary (within normal limits)      Neuro (within normal limits)      GI (within normal limits)      /Renal (within normal limits)      Liver (within normal limits)      Endocrine (within normal limits)      Hematology (within normal limits)      Musculoskeletal (within normal limits)      HEENT (within normal limits)      ID (within normal limits)      Skin (within normal limits)      GYN (within normal limits)       Clinical information reviewed:   Tobacco  Allergies  Meds   Med Hx  Surg Hx   Fam Hx  Soc Hx        NPO Detail:  NPO/Void Status  Carbohydrate Drink Given Prior to Surgery? : N  Date of Last Liquid: 05/30/24  Time of Last Liquid: 2100  Date of Last Solid: 05/30/24  Time of Last Solid: 1830  Last Intake Type: Clear fluids; Food  Time of Last Void: 0530         Physical Exam    Airway  Mallampati: II     Cardiovascular - normal exam  Rhythm: regular     Dental - normal exam     Pulmonary - normal exam     Abdominal - normal exam             Anesthesia Plan    History of general anesthesia?: no  History of complications of general anesthesia?: no    ASA 1     general, regional and MAC     The patient is not a current smoker.    intravenous induction   Anesthetic plan and risks discussed with patient.    Plan discussed with CRNA.

## 2024-05-31 NOTE — H&P
History Of Present Illness  Kaveh GUTIERREZ is a 23 y.o. male presenting for hand surgery.      Past Medical History  He has a past medical history of Chronic sinusitis, unspecified (02/13/2016) and Other specified health status.    Surgical History  He has a past surgical history that includes [place] tooth extraction.     Social History  He reports that he has never smoked. He has never used smokeless tobacco. He reports that he does not currently use alcohol. He reports that he does not use drugs.    Family History  No family history on file.     Allergies  Patient has no known allergies.    Denies CP, SOB, N, V, D     RRR  CTAB  Abdomen soft         Relevant Results  ceFAZolin, 2 g, intravenous, Once  dexAMETHasone (PF) (Decadron) 5 mg, EPINEPHrine HCl (PF) (Adrenalin) 0.05 mg, ropivacaine (Naropin) 5 mg/mL (0.5 %) 100 mg injection, , injection, Once      Continuous medications  lactated Ringer's, 100 mL/hr      PRN medications        Assessment  Presents for hand surgery   All questions addressed  Will proceed as planned    Sonia Dowd MD

## 2024-06-14 ENCOUNTER — HOSPITAL ENCOUNTER (OUTPATIENT)
Dept: RADIOLOGY | Facility: CLINIC | Age: 24
Discharge: HOME | End: 2024-06-14
Payer: COMMERCIAL

## 2024-06-14 ENCOUNTER — OFFICE VISIT (OUTPATIENT)
Dept: ORTHOPEDIC SURGERY | Facility: CLINIC | Age: 24
End: 2024-06-14
Payer: COMMERCIAL

## 2024-06-14 DIAGNOSIS — S62.012A DISPLACED FRACTURE OF DISTAL POLE OF NAVICULAR (SCAPHOID) BONE OF LEFT WRIST, INITIAL ENCOUNTER FOR CLOSED FRACTURE: ICD-10-CM

## 2024-06-14 PROCEDURE — 73100 X-RAY EXAM OF WRIST: CPT | Mod: LT

## 2024-06-14 NOTE — PROGRESS NOTES
S/p left wrist Ossicle excision.. Doing well. Denies numbness or tingling.     Physical Examination:  The patient appears to be their stated age, is in no apparent distress, and is oriented x3. The patients mood and affect are appropriate. The patients gait is normal. The examination of the limb in question was performed in comparison to the contralateral limb.    Dressing removed  Incision c/d/I  AIN, PIN, ulnar intact  SILT A/R/U/M  Hand wwp    Radiographs  Demonstrate a left wrist without acute osseous process    Assessment:  2 weeks post op    Plan:   Dressing removed today.  Placed into a removable brace.  Would like him to initiate occupational therapy working on range of motion and strengthening.  Removable brace can be taken off at home and sleeping.    Follow-up in 1 month for clinical check    Sonia Diehl MD

## 2024-06-26 ENCOUNTER — EVALUATION (OUTPATIENT)
Dept: OCCUPATIONAL THERAPY | Facility: CLINIC | Age: 24
End: 2024-06-26
Payer: COMMERCIAL

## 2024-06-26 DIAGNOSIS — S62.012A DISPLACED FRACTURE OF DISTAL POLE OF NAVICULAR (SCAPHOID) BONE OF LEFT WRIST, INITIAL ENCOUNTER FOR CLOSED FRACTURE: ICD-10-CM

## 2024-06-26 DIAGNOSIS — M25.632 WRIST STIFFNESS, LEFT: Primary | ICD-10-CM

## 2024-06-26 PROCEDURE — 97110 THERAPEUTIC EXERCISES: CPT | Mod: GO

## 2024-06-26 PROCEDURE — 97165 OT EVAL LOW COMPLEX 30 MIN: CPT | Mod: GO

## 2024-06-26 NOTE — PROGRESS NOTES
OCCUPATIONAL THERAPY UPPER EXTREMITY-HAND EVALUATION    Visit #: 1  Referred to Occupational Therapy for evaluation and treatment.  Referring Provider:   Sonia Beckwith *    Diagnosis:   1. 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    Payor: /Plan: /Product Type:  Payor: [facility] / Plan: [employer] / Product Type: *No Product type* /     Kaveh GUTIERREZ is a 23 y.o. right hand dominant male.     Past Medical History as of 6/26/2024 please see patient chart.    Employment: employed as . At [employer], [employer]    Identification was verified by patient verbalizing name and date of birth.    SUBJECTIVE:    Patient's Goals: To be able to use Left hand for resistive activities including weight lifting.    Pain scale: Pain is 0/10, increases to 4/10  Pain Located at left scaphoid and is described as aching  Pain addressed with Home Program    OBJECTIVE:    Functional Deficits/ADL Status: Patient reports difficulty with or unable to Opening jars/Containers , Meal Prep, and Lawn Care .  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    Appearance:  Scar Left dorsum of wrist scar adherence.    Palpation:  No tenderness    Range of motion over time: AROM (PROM noted in parenthesis)  All measurements documented in degrees.  If not noted, joint range of motion within functional limits                            Date: 11/19/2024     Arm: L     Elbow       Extension WNL     Flexion WNL     Forearm       Supination WNL     Pronation L 80 R 80     Wrist      Extension 60     Flexion 40     Ulnar  Dev. 15     Radial Dev.  15         Intact per patient, francisca formally assessed    Additional OT Assessments:    Functional Outcome Measure: DASH score (Disability of Arm, Shoulder, Hand)    Date:    6/26/2024     Disability%:  26         Treatment Today: See Home Exercise Program    In 11a Out 11:45a  45 min treatment time  Eval x 1  TE x 1-15 min    Home ex: 3 x day, 10 each  Scar mobilization to dorsum of left wrist  Tubigrip size D at night  Scar conformer at night  Active wrist ext/flex stretches- 10 sec holds  Passive wrist ext/flex stretches--10 sec holds  Recommended purchase of red therabar to start 7/10/2024  Resume light ADL's    Assessment:  S/p 4 weeks left wrist pain due to a scaphoid fracture-treated conservatively.  Patient had an ossicle on the scaphoid that was excised.  He was initially seen by outside Ortho doctor but did not get the medical treatment needed to resolve his wrist pain, so he was seen by above Ortho/Hand Specialist MD.    He presents with left wrist stiffness and slight achey symptoms with end range stretching.  Functionally he is limited due to strength and post op healing.  He will benefit from formal OT services to regain max functional use of left hand and resume weight lifting.  Patient agreed with POC.      Goals to be achieved within 4 visits:  Patient will have a Dash score decreased by 10%.  Patient will have functional wrist mobility to resume weight lifting.  Patient will report decreased pain/symptoms by 2 levels.  Patient will have functional  strength in order to open tight jars/containers.      Plan:  RTC 1 x week for 4 visits.  Treatment will include passive wrist stretch, scar management and strengthening.

## 2024-07-08 ENCOUNTER — TREATMENT (OUTPATIENT)
Dept: OCCUPATIONAL THERAPY | Facility: CLINIC | Age: 24
End: 2024-07-08
Payer: COMMERCIAL

## 2024-07-08 DIAGNOSIS — S62.012A DISPLACED FRACTURE OF DISTAL POLE OF NAVICULAR (SCAPHOID) BONE OF LEFT WRIST, INITIAL ENCOUNTER FOR CLOSED FRACTURE: ICD-10-CM

## 2024-07-08 DIAGNOSIS — S62.012A DISPLACED FRACTURE OF DISTAL POLE OF NAVICULAR (SCAPHOID) BONE OF LEFT WRIST, INITIAL ENCOUNTER FOR CLOSED FRACTURE: Primary | ICD-10-CM

## 2024-07-08 DIAGNOSIS — M25.632 WRIST STIFFNESS, LEFT: Primary | ICD-10-CM

## 2024-07-08 PROCEDURE — 97110 THERAPEUTIC EXERCISES: CPT | Mod: GO

## 2024-07-08 RX ORDER — CEPHALEXIN 500 MG/1
500 CAPSULE ORAL EVERY 12 HOURS
Qty: 20 CAPSULE | Refills: 0 | Status: SHIPPED | OUTPATIENT
Start: 2024-07-08 | End: 2024-07-18

## 2024-07-08 NOTE — PROGRESS NOTES
OCCUPATIONAL THERAPY UPPER EXTREMITY Progress Note    Visit #: 2  Referred to Occupational Therapy for evaluation and treatment.  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    Payor: /Plan: /Product Type:  Payor: [employer] [place] / Plan: [employer] / Product Type: *No Product type* /     Kaveh GUTIERREZ is a 23 y.o. right hand dominant male.     Past Medical History as of 6/26/2024 please see patient chart.    Employment: employed as . At [employer], [employer]    Identification was verified by patient verbalizing name and date of birth.    SUBJECTIVE:    Patient's Goals: To be able to use Left hand for resistive activities including weight lifting.    Pain scale: Pain is 0/10, increases to 4/10  Pain Located at left scaphoid and is described as aching  Pain addressed with Home Program    OBJECTIVE:    Functional Deficits/ADL Status: Patient reports difficulty with or unable to Opening jars/Containers , Meal Prep, and Lawn Care .  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    Appearance:  Scar Left dorsum of wrist scar with mod eschar surrounded by raised scar & palpable tenderness.    Palpation:  Left dorsum of wrist tender to palpate at incision site    Range of motion over time: AROM (PROM noted in parenthesis)  All measurements documented in degrees.  If not noted, joint range of motion within functional limits                            Date: 6/26/2024 7/8/2024    Arm: L L    Elbow       Extension WNL     Flexion WNL     Forearm       Supination WNL     Pronation L 80 R 80     Wrist      Extension 60 55    Flexion 40 45    Ulnar Dev.  15     Radial Dev.  15         Intact per patient, francisca formally assessed    Additional OT Assessments:    Functional Outcome Measure: DASH score (Disability of Arm, Shoulder, Hand)    Date:    6/26/2024     Disability%:  26         Treatment Today: See Home Exercise Program    In 7:05a Out 7:55a  50 min treatment time  TE x 3-50 min    Home ex: 3 x day, 10 each  Scar mobilization to dorsum of left wrist  Tubigrip size D at night  Scar conformer at night  Active wrist ext/flex stretches- 10 sec holds  Passive wrist ext/flex stretches--10 sec holds  Recommended purchase of red therabar to start 7/10/2024  Resume light ADL's  Updated 7/8/2024  Epsom salt warm soak x 5-10 min  Kinesiotape scar-after infection has ceased  Wall or table top stretches for wrist ext/flex x 5-10 min  Grocery bag 5lb stretch x 5 min followed by wrist PRE's    Assessment:  S/p 6 weeks left wrist pain due to a scaphoid fracture-treated conservatively.  Patient had an ossicle on the scaphoid that was excised.  He was initially seen by outside Ortho doctor but did not get the medical treatment needed to resolve his wrist pain, so he was seen by above Ortho/Hand Specialist MD.    He presents with left wrist stiffness and increased redness surrounding eschar today.  Notified MD and provided keflex prescription.   Functionally, he would like to resume weight lifting and will intiate after infection subsides.  Updated HEP provided to resume after 48 hours of antibiotics.   He will benefit from formal OT services to regain max functional use of left hand and resume weight lifting.  Patient agreed with POC.      Goals to be achieved within 4 visits:  Patient will have a Dash score decreased by 10%.  Patient will have functional wrist mobility to resume weight lifting.  Patient will report decreased pain/symptoms by 2 levels.  Patient will have functional  strength in order to open tight jars/containers.    Next session:  recheck wound, scar  management and prolong wrist stretches, wrist PRE's.    Plan:  RTC 1 x week for 4 visits.  Treatment will include passive wrist stretch, scar management and strengthening.

## 2024-07-15 ENCOUNTER — OFFICE VISIT (OUTPATIENT)
Dept: ORTHOPEDIC SURGERY | Facility: CLINIC | Age: 24
End: 2024-07-15
Payer: COMMERCIAL

## 2024-07-15 ENCOUNTER — TREATMENT (OUTPATIENT)
Dept: OCCUPATIONAL THERAPY | Facility: CLINIC | Age: 24
End: 2024-07-15
Payer: COMMERCIAL

## 2024-07-15 ENCOUNTER — HOSPITAL ENCOUNTER (OUTPATIENT)
Dept: RADIOLOGY | Facility: CLINIC | Age: 24
Discharge: HOME | End: 2024-07-15
Payer: COMMERCIAL

## 2024-07-15 DIAGNOSIS — S62.012A DISPLACED FRACTURE OF DISTAL POLE OF NAVICULAR (SCAPHOID) BONE OF LEFT WRIST, INITIAL ENCOUNTER FOR CLOSED FRACTURE: ICD-10-CM

## 2024-07-15 DIAGNOSIS — M25.632 WRIST STIFFNESS, LEFT: Primary | ICD-10-CM

## 2024-07-15 PROCEDURE — 73100 X-RAY EXAM OF WRIST: CPT | Mod: LT

## 2024-07-15 PROCEDURE — 97140 MANUAL THERAPY 1/> REGIONS: CPT | Mod: GO

## 2024-07-15 PROCEDURE — 99211 OFF/OP EST MAY X REQ PHY/QHP: CPT | Performed by: STUDENT IN AN ORGANIZED HEALTH CARE EDUCATION/TRAINING PROGRAM

## 2024-07-15 PROCEDURE — 73100 X-RAY EXAM OF WRIST: CPT | Mod: LEFT SIDE | Performed by: STUDENT IN AN ORGANIZED HEALTH CARE EDUCATION/TRAINING PROGRAM

## 2024-07-15 PROCEDURE — 97022 WHIRLPOOL THERAPY: CPT | Mod: GO

## 2024-07-15 PROCEDURE — 97110 THERAPEUTIC EXERCISES: CPT | Mod: GO

## 2024-07-15 NOTE — PROGRESS NOTES
OCCUPATIONAL THERAPY UPPER EXTREMITY Progress Note    Visit #: 3  Referred to Occupational Therapy for evaluation and treatment.  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    Payor: /Plan: /Product Type:  Payor: [employer] [place] / Plan: [employer] / Product Type: *No Product type* /     Kaveh GUTIERREZ is a 23 y.o. right hand dominant male.     Past Medical History as of 6/26/2024 please see patient chart.    Employment: employed as . [employer]    Identification was verified by patient verbalizing name and date of birth.    SUBJECTIVE:    Patient's Goals: To be able to use Left hand for resistive activities including weight lifting.    Pain scale: Pain is 0/10, increases to 2/10 with stretching  Pain Located at left scaphoid and is described as aching  Pain addressed with Home Program and stretching/strengthening    OBJECTIVE:    Functional Deficits/ADL Status: Patient reports able to Opening jars/Containers , Meal Prep, and Lawn Care .  Unable to lift weights correctly, which is his biggest goal.  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    Appearance:  Scar Left dorsum of wrist scar with mod-min eschar with decreased redness noted from last session.    Palpation:  Left dorsum of wrist non tender to palpate    Range of motion over time: AROM (PROM noted in parenthesis)  All measurements documented in degrees.  If not noted, joint range of motion within functional limits                            Date: 6/26/2024 7/8/2024 7/15/2024   Arm: L L L   Elbow       Extension WNL     Flexion WNL     Forearm       Supination WNL      Pronation L 80 R 80     Wrist      Extension 60 55 L61 R 60   Flexion 40 45 L42 R 70   Ulnar Dev. 15     Radial Dev.  15       :    R 103 lbs  L 99 lbs    Intact per patient, francisca formally assessed        Functional Outcome Measure: DASH score (Disability of Arm, Shoulder, Hand)    Date:    6/26/2024     Disability%:  26         Treatment Today: See Home Exercise Program    In 7:05a Out 8a  55 min treatment time  Fluido x 1 - 10 min  Manual x 1 - 1 min  TE x 2-30 min    Fluido x 10 min  Active wrist flexion x 10 - neutral/supination  5lb wrist prolong stretch  Manual:  STM to left forearm/wrist/hand  Joint mobilization to left wrist cmcj's  PROM stretching left wrist  Strengthening:  Wrist PRE's 4lbs, 5lbs x 20 each-1 set  Forearm bar -sup/pro x  20 - 2lbs  Isometric  x 10 - 3 sets, 20lbs      Home ex: 3 x day, 10 each  Scar mobilization to dorsum of left wrist  Tubigrip size D at night  Scar conformer at night  Active wrist ext/flex stretches- 10 sec holds  Passive wrist ext/flex stretches--10 sec holds  Recommended purchase of red therabar to start 7/10/2024  Resume light ADL's  Updated 7/8/2024  Epsom salt warm soak x 5-10 min  Kinesiotape scar-after infection has ceased  Wall or table top stretches for wrist ext/flex x 5-10 min  Grocery bag 5lb stretch x 5 min followed by wrist PRE's    Assessment:  S/p 6.5 weeks left wrist scaphoid ossicle excised due to a scaphoid fracture-treated conservatively and ongoing pain.  He was initially seen by outside Ortho doctor but did not get the medical treatment needed to resolve his wrist pain, so he was seen by above Ortho/Hand Specialist MD.  -decreased wrist pain today by 2 levels  -signs of infection have resolved  -min eschar dorsum of wrist  -resolved palpable tenderness  -excellent  strength  -functionally, unable to lift weights with ergonomic accuracy  -Tolerated strengthening without increased pain  -has a couple of more days of antibiotics   He will  benefit from formal OT services to regain max functional use of left hand and resume weight lifting.  Patient agreed with POC.      Goals to be achieved within 4 visits:  Patient will have a Dash score decreased by 10%.  Patient will have functional wrist mobility to resume weight lifting.  Patient will report decreased pain/symptoms by 2 levels.  Patient will have functional  strength in order to open tight jars/containers.    Next session:  recheck wound, scar management and prolong wrist stretches, wrist PRE's.    Plan:  RTC 1 x week for 4 visits.  Treatment will include passive wrist stretch, scar management and strengthening.

## 2024-07-15 NOTE — PROGRESS NOTES
S/p left wrist Ossicle excision.. Doing well. Denies numbness or tingling.  Has been in occupational therapy with overall improvement.  States that pain is different from preop and improving.    Physical Examination:  The patient appears to be their stated age, is in no apparent distress, and is oriented x3. The patients mood and affect are appropriate. The patients gait is normal. The examination of the limb in question was performed in comparison to the contralateral limb.    He did have evidence of suture spitting today.  This was a deep Vicryl suture that appears to work its way out.  This was removed without issue today.  Wrist ROM: 45/60  Full pronation/ supination  AIN, PIN, ulnar intact  SILT A/R/U/M  Hand wwp    Radiographs  Demonstrate a left wrist without acute osseous process    Assessment:  6 weeks post op    Plan:   He is weaned out of removable brace.  Can progress to weightbearing as tolerated.  Continue occupational therapy.  Follow-up in 2 months for clinical check.    Sonia Diehl MD

## 2024-07-29 ENCOUNTER — APPOINTMENT (OUTPATIENT)
Dept: OCCUPATIONAL THERAPY | Facility: CLINIC | Age: 24
End: 2024-07-29
Payer: COMMERCIAL

## 2024-07-29 DIAGNOSIS — M25.632 WRIST STIFFNESS, LEFT: Primary | ICD-10-CM

## 2024-07-29 PROCEDURE — 97022 WHIRLPOOL THERAPY: CPT | Mod: GO

## 2024-07-29 PROCEDURE — 97140 MANUAL THERAPY 1/> REGIONS: CPT | Mod: GO

## 2024-07-29 PROCEDURE — 97110 THERAPEUTIC EXERCISES: CPT | Mod: GO

## 2024-07-29 NOTE — PROGRESS NOTES
OCCUPATIONAL THERAPY UPPER EXTREMITY Progress Note-d/c'ed today    Visit #: 4  Referred to Occupational Therapy for evaluation and treatment.  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    Payor: /Plan: /Product Type:  Payor: [employer] [place] / Plan: [employer] / Product Type: *No Product type* /     Kaveh GUTIERREZ is a 23 y.o. right hand dominant male.     Past Medical History as of 6/26/2024 please see patient chart.    Employment: employed as . [employer]    Identification was verified by patient verbalizing name and date of birth.    SUBJECTIVE:    Patient's Goals: To be able to use Left hand for resistive activities including weight lifting.    Pain scale: Pain is 0/10, increases to 2/10 with stretching and push ups  Pain Located at left scaphoid and is described as aching  Pain addressed with Home Program and stretching/strengthening    OBJECTIVE:    Functional Deficits/ADL Status: Patient reports able to Opening jars/Containers , Meal Prep, and Lawn Care .  Unable to lift weights correctly, which is his biggest goal.  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    Appearance:  Scar Left dorsum of wrist scar with mod-min eschar with decreased redness noted from last session.    Palpation:  Left dorsum of wrist non tender to palpate    Range of motion over time: AROM (PROM noted in parenthesis)  All measurements documented in degrees.  If not noted, joint range of motion within functional limits                            Date: 6/26/2024 7/8/2024 7/15/2024   Arm: L L L   Elbow       Extension WNL     Flexion  WNL     Forearm       Supination WNL     Pronation L 80 R 80     Wrist      Extension 60 55 L61 R 60   Flexion 40 45 L42 R 70   Ulnar Dev. 15     Radial Dev.  15       :    R 103 lbs  L 99 lbs    Intact per patient, francisca formally assessed        Functional Outcome Measure: DASH score (Disability of Arm, Shoulder, Hand)    Date:    6/26/2024     Disability%:  26         Treatment Today: See Home Exercise Program    In 7:05a Out 8a  55 min treatment time  Fluido x 1 - 10 min  Manual x 2 - 25 min  TE x 1-15 min    Fluido x 10 min  Active wrist flexion x 10 - neutral/supination  5lb wrist prolong stretch  Manual:  STM to left forearm/wrist/hand  Joint mobilization to left wrist cmcj's  PROM stretching left wrist  Strengthening:  Wrist tband ext/flex x 20 - 2 set  Forearm bar -sup/pro x 20- green tband  Isometric  with 2lb ball x 10 - 3 sets  Provided with green tband for updated HEP  Wrist stabilization ex with thera web and weighted ball  Dart throwers motion with green tband      Home ex: 3 x day, 10 each  Scar mobilization to dorsum of left wrist  Tubigrip size D at night  Scar conformer at night  Active wrist ext/flex stretches- 10 sec holds  Passive wrist ext/flex stretches--10 sec holds  Recommended purchase of red therabar to start 7/10/2024  Resume light ADL's  Updated 7/8/2024  Epsom salt warm soak x 5-10 min  Kinesiotape scar-after infection has ceased  Wall or table top stretches for wrist ext/flex x 5-10 min  Grocery bag 5lb stretch x 5 min followed by wrist PRE's  7/29/2024:  Updated HEP with green tband, see above.    Assessment:  S/p 8 weeks left wrist scaphoid ossicle excised due to a scaphoid fracture-treated conservatively and ongoing pain.  He was initially seen by outside Ortho doctor but did not get the medical treatment needed to resolve his wrist pain, so he was seen by above Ortho/Hand Specialist MD.  -pain is due to stiffness and stretching  -signs of infection have resolved  -min eschar  dorsum of wrist  -resolved palpable tenderness  -excellent  strength  -functionally, has intitiated resuming strength training.  -Tolerated strengthening without increased pain  -has reached max therapeutic benefits  D/C'ed today.   Patient agreed with POC.      Goals to be achieved within 4 visits:  achieved 7/29/2024  Patient will have a Dash score decreased by 10%.  Patient will have functional wrist mobility to resume weight lifting.  Patient will report decreased pain/symptoms by 2 levels.  Patient will have functional  strength in order to open tight jars/containers.      Plan:  RTC 1 x week for 4 visits.  Treatment will include passive wrist stretch, scar management and strengthening.

## 2024-09-16 ENCOUNTER — OFFICE VISIT (OUTPATIENT)
Dept: ORTHOPEDIC SURGERY | Facility: CLINIC | Age: 24
End: 2024-09-16
Payer: COMMERCIAL

## 2024-09-16 DIAGNOSIS — S62.012A DISPLACED FRACTURE OF DISTAL POLE OF NAVICULAR (SCAPHOID) BONE OF LEFT WRIST, INITIAL ENCOUNTER FOR CLOSED FRACTURE: ICD-10-CM

## 2024-09-16 PROCEDURE — 1036F TOBACCO NON-USER: CPT | Performed by: STUDENT IN AN ORGANIZED HEALTH CARE EDUCATION/TRAINING PROGRAM

## 2024-09-16 PROCEDURE — 99213 OFFICE O/P EST LOW 20 MIN: CPT | Performed by: STUDENT IN AN ORGANIZED HEALTH CARE EDUCATION/TRAINING PROGRAM

## 2024-09-16 NOTE — PROGRESS NOTES
S/p left wrist Ossicle excision 5/31/24.. Doing well. Denies numbness or tingling.  Complete improvement from preoperative pain.    Physical Examination:  The patient appears to be their stated age, is in no apparent distress, and is oriented x3. The patients mood and affect are appropriate. The patients gait is normal. The examination of the limb in question was performed in comparison to the contralateral limb.      Wrist ROM: 8080  Full pronation/ supination  AIN, PIN, ulnar intact  SILT A/R/U/M  Hand wwp      Assessment:  3 months postop      Plan:   Continue weight-bear as tolerated.  Activity as tolerated.  No restrictions.  Follow-up as needed.  Discussed aches and pains to occur randomly up to a year from surgery.  All questions were addressed    Sonia Diehl MD

## 2024-10-22 DIAGNOSIS — S62.012A DISPLACED FRACTURE OF DISTAL POLE OF NAVICULAR (SCAPHOID) BONE OF LEFT WRIST, INITIAL ENCOUNTER FOR CLOSED FRACTURE: ICD-10-CM

## 2024-10-22 DIAGNOSIS — M25.532 LEFT WRIST PAIN: Primary | ICD-10-CM

## 2024-10-22 RX ORDER — IBUPROFEN 800 MG/1
800 TABLET ORAL EVERY 8 HOURS PRN
Qty: 15 TABLET | Refills: 0 | Status: SHIPPED | OUTPATIENT
Start: 2024-10-22 | End: 2024-10-27

## 2024-11-12 ENCOUNTER — HOSPITAL ENCOUNTER (OUTPATIENT)
Dept: RADIOLOGY | Facility: CLINIC | Age: 24
Discharge: HOME | End: 2024-11-12
Payer: COMMERCIAL

## 2024-11-12 ENCOUNTER — APPOINTMENT (OUTPATIENT)
Dept: ORTHOPEDIC SURGERY | Facility: CLINIC | Age: 24
End: 2024-11-12
Payer: COMMERCIAL

## 2024-11-12 DIAGNOSIS — M54.10 BACK PAIN WITH RADICULOPATHY: ICD-10-CM

## 2024-11-12 DIAGNOSIS — M54.10 BACK PAIN WITH RADICULOPATHY: Primary | ICD-10-CM

## 2024-11-12 PROCEDURE — 72120 X-RAY BEND ONLY L-S SPINE: CPT

## 2024-11-12 PROCEDURE — 99214 OFFICE O/P EST MOD 30 MIN: CPT | Performed by: PHYSICIAN ASSISTANT

## 2024-11-12 PROCEDURE — 72110 X-RAY EXAM L-2 SPINE 4/>VWS: CPT | Performed by: RADIOLOGY

## 2024-11-12 PROCEDURE — 1036F TOBACCO NON-USER: CPT | Performed by: PHYSICIAN ASSISTANT

## 2024-11-12 ASSESSMENT — PAIN SCALES - GENERAL: PAINLEVEL_OUTOF10: 4

## 2024-11-12 ASSESSMENT — PAIN - FUNCTIONAL ASSESSMENT: PAIN_FUNCTIONAL_ASSESSMENT: 0-10

## 2024-11-12 NOTE — PROGRESS NOTES
New patient to establish to the practice comes to the office complaining of back pain.  He said for a few years after a accident he had in 2023 he saw [facility] at [facility] and they told him there was nothing wrong to cause his pain.  He will be pain in his back will go out periodically.  We have gone over the right side of his low back and also down the right leg occasionally.  It will go across the back and even up the back.  He had an injection with [facility] that helped for about a week.  No other recent treatment.  Said physical therapy in the past he said muscle relaxants in the past.  No bowel or bladder complaints no saddle anesthesia no gait or balance changes no recent trauma accidents or falls no spine surgeries.    We looked at patient's prior lab work saw a rheumatoid factor that was done negative.  Vitamin D level was 69 we looked at primary doctors note check the medication list to see if he is on a medication that can cause muscular aches and pains I do not see that.    Physical exam: Well-nourished, well kept.  No lymphangitis or lymphadenopathy in the examined extremities.  Good perfusion to the extremities ×4.  Radial and dorsalis pedis pulses 2+.  Capillary refill to all 4 digits brisk.  No distal edema x 4.  Patient ambulating with no difficulty full range of motion cervical spine and observation no abnormalities noted.  Motor strength intact decreased range of motion flexion extension rotation lumbar spine tender in right lower lumbar region.  No instability moving all upper extremities without any difficulty motor strength intact examination of the lower extremities reveals no point tenderness, swelling, or deformity.  Range of motion of the hips, knees, and ankles are full without crepitance, instability, or exacerbation of pain.  Strength is 5/5 throughout.  No redness, abrasions, or lesions on all 4 extremities, head and neck, or trunk.  Patient neurologically  intact    X-ray: X-rays that were taken today and reviewed show really no bony abnormalities maybe a very mild degenerative change at the L5-S1 level no fractures dislocations bony lytic lesions.  He has a report from [facility] on an MRI done which showed a couple disc bulges and some facet arthritis but nothing major.  That report was reviewed     Assessment: This a patient with chronic history of back pain over the past couple years.  His back goes out periodically I talked to him about starting some conservative treatment again and obtaining a new MRI.  I seen the MRI that were done at [facility] and they are not the best quality.  His symptoms of gotten worse in the over the past 2 years.    Plan: Will get him into physical therapy [facility].  Will get an MRI at [facility] for diagnostic purposes we will follow-up after those films

## 2024-11-23 ENCOUNTER — APPOINTMENT (OUTPATIENT)
Dept: RADIOLOGY | Facility: HOSPITAL | Age: 24
End: 2024-11-23
Payer: COMMERCIAL

## 2024-12-06 DIAGNOSIS — M54.10 BACK PAIN WITH RADICULOPATHY: ICD-10-CM

## 2024-12-06 RX ORDER — CYCLOBENZAPRINE HCL 10 MG
10 TABLET ORAL 3 TIMES DAILY PRN
Qty: 30 TABLET | Refills: 0 | Status: SHIPPED | OUTPATIENT
Start: 2024-12-06 | End: 2024-12-16

## 2024-12-10 ENCOUNTER — APPOINTMENT (OUTPATIENT)
Dept: RADIOLOGY | Facility: HOSPITAL | Age: 24
End: 2024-12-10
Payer: COMMERCIAL

## 2025-01-27 DIAGNOSIS — M54.10 BACK PAIN WITH RADICULOPATHY: ICD-10-CM

## 2025-02-05 ENCOUNTER — APPOINTMENT (OUTPATIENT)
Dept: RADIOLOGY | Facility: HOSPITAL | Age: 25
End: 2025-02-05
Payer: COMMERCIAL

## 2025-02-16 ENCOUNTER — APPOINTMENT (OUTPATIENT)
Dept: RADIOLOGY | Facility: HOSPITAL | Age: 25
End: 2025-02-16
Payer: COMMERCIAL

## 2025-03-02 ENCOUNTER — APPOINTMENT (OUTPATIENT)
Dept: RADIOLOGY | Facility: HOSPITAL | Age: 25
End: 2025-03-02
Payer: COMMERCIAL

## 2025-03-16 ENCOUNTER — APPOINTMENT (OUTPATIENT)
Dept: RADIOLOGY | Facility: HOSPITAL | Age: 25
End: 2025-03-16
Payer: COMMERCIAL

## 2025-04-04 ENCOUNTER — OFFICE VISIT (OUTPATIENT)
Dept: ORTHOPEDIC SURGERY | Facility: CLINIC | Age: 25
End: 2025-04-04
Payer: COMMERCIAL

## 2025-04-04 ENCOUNTER — HOSPITAL ENCOUNTER (OUTPATIENT)
Dept: RADIOLOGY | Facility: CLINIC | Age: 25
Discharge: HOME | End: 2025-04-04
Payer: COMMERCIAL

## 2025-04-04 DIAGNOSIS — M25.531 RIGHT WRIST PAIN: ICD-10-CM

## 2025-04-04 PROCEDURE — 73110 X-RAY EXAM OF WRIST: CPT | Mod: RT

## 2025-04-04 RX ORDER — LIDOCAINE HYDROCHLORIDE 10 MG/ML
1 INJECTION, SOLUTION INFILTRATION; PERINEURAL
Status: COMPLETED | OUTPATIENT
Start: 2025-04-04 | End: 2025-04-04

## 2025-04-04 RX ADMIN — LIDOCAINE HYDROCHLORIDE 1 ML: 10 INJECTION, SOLUTION INFILTRATION; PERINEURAL at 15:17

## 2025-04-04 NOTE — PROGRESS NOTES
History of Present Illness:  Patient presenting with complaints of right wrist pain.  Patient has history of left wrist  Ossicle excision in May of last year.  Patient states wrist pain is worse with pressing motions and weightbearing.  He denies any injury to the area.    Review of Systems   GENERAL: Negative for malaise, significant weight loss, fever  MUSCULOSKELETAL: see HPI  NEURO:  Negative    The patient's past medical history, family history, social history, and review of systems were reviewed. History is otherwise negative except as stated in the HPI.    Physical Examination:  General: Alert and oriented to person, place, and time.  No acute distress and breathing comfortably: Pleasant and cooperative with examination.  HEENT: Head is normocephalic and atraumatic.  Neck: Supple, no visible swelling.  Cardiovascular: No palpable tachycardia  Lungs: No audible wheezing or labored breathing  Abdomen: Nondistended.    On musculoskeletal examination,   There is tenderness to palpation of the right radiocarpal joint.  Flexion and extension of the right wrist is limited by pain.  Sensation intact to light touch.  AIN PIN intact.  Intrinsic and extrinsic strength 5/5.  Thenar strength 5/5.    Imaging:  Radiographic notes: X-ray 3 views of the right wrist reviewed.  Reveal no acute osseous abnormality.    M Inj/Asp: R radiocarpal on 4/4/2025 3:17 PM  Indications: pain  Details: 24 G needle, volar approach  Medications: 10 mg triamcinolone acetonide 10 mg/mL; 1 mL lidocaine 10 mg/mL (1 %)  Outcome: tolerated well, no immediate complications  Procedure, treatment alternatives, risks and benefits explained, specific risks discussed. Consent was given by the patient. Immediately prior to procedure a time out was called to verify the correct patient, procedure, equipment, support staff and site/side marked as required. Patient was prepped and draped in the usual sterile fashion.             Assessment:  Patient with  right radiocarpal pain.  Recommend cortisone injection today due to radiocarpal joint.    Plan:  Injection.  I explained the risks and benefits of an injection. Specifically, I reviewed the risks of injection, which include, but are not limited to, infection, bleeding, nerve injury, pain, steroid flare, glycemic alteration, subcutaneous fat atrophy, skin hypopigmentation, soft tissue damage, and incomplete symptom relief. At this time, the patient would like to proceed with an injection. After obtaining consent, I injected a 1mL combination of Kenalog and 1% lidocaine into the right radiocarpal joint, sterile technique. The injection site was dressed, and the patient tolerated the injection well. I am hopeful that this injection will serve diagnostic, prognostic, and therapeutic purposes. Finally, I have emphasized patience, as any benefit may take several weeks to manifest. Depending on the success of the injection, I will see them back as needed.        Reginald Diaz PA-C      In a face to face encounter, I performed a history and physical examination, discussed pertinent diagnostic studies if indicated, and discussed diagnosis and management strategies with both the patient and the mid-level provider. I reviewed the mid-level's note and agree with the documented findings and plan of care. Addendums and additions to note have been added as appropriate.     Sonia Dowd MD

## 2025-04-13 ENCOUNTER — OFFICE VISIT (OUTPATIENT)
Dept: URGENT CARE | Age: 25
End: 2025-04-13
Payer: COMMERCIAL

## 2025-04-13 VITALS
OXYGEN SATURATION: 98 % | WEIGHT: 170 LBS | SYSTOLIC BLOOD PRESSURE: 119 MMHG | HEIGHT: 70 IN | DIASTOLIC BLOOD PRESSURE: 77 MMHG | HEART RATE: 70 BPM | BODY MASS INDEX: 24.34 KG/M2 | RESPIRATION RATE: 17 BRPM | TEMPERATURE: 97.6 F

## 2025-04-13 DIAGNOSIS — H66.001 NON-RECURRENT ACUTE SUPPURATIVE OTITIS MEDIA OF RIGHT EAR WITHOUT SPONTANEOUS RUPTURE OF TYMPANIC MEMBRANE: ICD-10-CM

## 2025-04-13 DIAGNOSIS — J02.9 SORE THROAT: Primary | ICD-10-CM

## 2025-04-13 PROBLEM — M54.16 LUMBAR RADICULOPATHY: Status: ACTIVE | Noted: 2023-08-10

## 2025-04-13 PROBLEM — M47.817 LUMBOSACRAL SPONDYLOSIS WITHOUT MYELOPATHY: Status: ACTIVE | Noted: 2023-05-04

## 2025-04-13 PROBLEM — M41.9 SCOLIOSIS: Status: ACTIVE | Noted: 2025-04-13

## 2025-04-13 PROBLEM — J32.9 SINUSITIS: Status: ACTIVE | Noted: 2025-04-13

## 2025-04-13 PROBLEM — S80.00XA CONTUSION OF KNEE: Status: ACTIVE | Noted: 2017-01-23

## 2025-04-13 LAB
POC HUMAN RHINOVIRUS PCR: NEGATIVE
POC INFLUENZA A VIRUS PCR: NEGATIVE
POC INFLUENZA B VIRUS PCR: NEGATIVE
POC RESPIRATORY SYNCYTIAL VIRUS PCR: NEGATIVE
POC STREPTOCOCCUS PYOGENES (GROUP A STREP) PCR: NEGATIVE

## 2025-04-13 PROCEDURE — 87631 RESP VIRUS 3-5 TARGETS: CPT

## 2025-04-13 PROCEDURE — 1036F TOBACCO NON-USER: CPT

## 2025-04-13 PROCEDURE — 3008F BODY MASS INDEX DOCD: CPT

## 2025-04-13 PROCEDURE — 99204 OFFICE O/P NEW MOD 45 MIN: CPT

## 2025-04-13 PROCEDURE — 87651 STREP A DNA AMP PROBE: CPT

## 2025-04-13 RX ORDER — PREDNISONE 20 MG/1
20 TABLET ORAL DAILY
Qty: 5 TABLET | Refills: 0 | Status: SHIPPED | OUTPATIENT
Start: 2025-04-13 | End: 2025-04-18

## 2025-04-13 RX ORDER — AZITHROMYCIN 250 MG/1
TABLET, FILM COATED ORAL
Qty: 6 TABLET | Refills: 0 | Status: SHIPPED | OUTPATIENT
Start: 2025-04-13 | End: 2025-04-18

## 2025-04-13 RX ORDER — BROMPHENIRAMINE MALEATE, PSEUDOEPHEDRINE HYDROCHLORIDE, AND DEXTROMETHORPHAN HYDROBROMIDE 2; 30; 10 MG/5ML; MG/5ML; MG/5ML
10 SYRUP ORAL 4 TIMES DAILY PRN
Qty: 200 ML | Refills: 0 | Status: SHIPPED | OUTPATIENT
Start: 2025-04-13 | End: 2025-04-23

## 2025-04-13 ASSESSMENT — ENCOUNTER SYMPTOMS
SINUS PRESSURE: 1
VOICE CHANGE: 1
FEVER: 1
HEADACHES: 1
DIARRHEA: 0
RHINORRHEA: 1
SINUS COMPLAINT: 1
CARDIOVASCULAR NEGATIVE: 1
CHILLS: 1
GASTROINTESTINAL NEGATIVE: 1
NAUSEA: 0
COUGH: 1
VOMITING: 0
FATIGUE: 1
SORE THROAT: 1
ABDOMINAL PAIN: 0

## 2025-04-13 NOTE — PROGRESS NOTES
"Subjective   Patient ID: Kaveh GUTIERREZ is a 24 y.o. male. They present today with a chief complaint of Sore Throat, Headache, and Sinus Problem (2 days).    History of Present Illness  Subjective  History was provided by the patient.  Kaveh GUTIERREZ is a 24 y.o. male who presents for evaluation of symptoms of a URI. Symptoms include low grade fevers, chills, dry cough, headache, hoarseness, nasal blockage, post nasal drip, sinus and nasal congestion, and sore throat. Onset of symptoms was 2 days ago, gradually worsening since that time. Associated symptoms include bilateral ear pressure/pain. He is drinking moderate amounts of fluids. Evaluation to date: none. Treatment to date: antihistamines and decongestants    Objective  /77   Pulse 70   Temp 36.4 °C (97.6 °F) (Oral)   Resp 17   Ht 1.778 m (5' 10\")   Wt 77.1 kg (170 lb)   SpO2 98%   BMI 24.39 kg/m²       Assessment/Plan  otitis media    Suggested symptomatic OTC remedies.  Nasal saline spray for congestion.  Zithromax per orders.  Follow up as needed.        History provided by:  Patient and medical records  Sore Throat   Associated symptoms include congestion, coughing, ear pain and headaches. Pertinent negatives include no abdominal pain, diarrhea or vomiting.   Headache  Associated symptoms: congestion, cough, ear pain, fatigue, fever, sinus pressure and sore throat    Associated symptoms: no abdominal pain, no diarrhea, no nausea and no vomiting    Sinus Problem  Associated symptoms: congestion, cough, ear pain, fatigue, fever, headaches, rhinorrhea and sore throat    Associated symptoms: no abdominal pain, no diarrhea, no nausea and no vomiting        Past Medical History  Allergies as of 04/13/2025 - Reviewed 04/13/2025   Allergen Reaction Noted    Amoxicillin Other and GI Upset 02/17/2022       (Not in a hospital admission)       Past Medical History:   Diagnosis Date    Chronic sinusitis, unspecified 02/13/2016    Clinical sinusitis    " "Other specified health status     No pertinent past medical history       Past Surgical History:   Procedure Laterality Date    WISDOM TOOTH EXTRACTION          reports that he has never smoked. He has never used smokeless tobacco. He reports that he does not currently use alcohol. He reports that he does not use drugs.    Review of Systems  Review of Systems   Constitutional:  Positive for chills, fatigue and fever.   HENT:  Positive for congestion, ear pain, rhinorrhea, sinus pressure, sore throat and voice change.    Respiratory:  Positive for cough.    Cardiovascular: Negative.    Gastrointestinal: Negative.  Negative for abdominal pain, diarrhea, nausea and vomiting.   Genitourinary: Negative.    Neurological:  Positive for headaches.   All other systems reviewed and are negative.                                 Objective    Vitals:    04/13/25 1648   BP: 119/77   Pulse: 70   Resp: 17   Temp: 36.4 °C (97.6 °F)   TempSrc: Oral   SpO2: 98%   Weight: 77.1 kg (170 lb)   Height: 1.778 m (5' 10\")     No LMP for male patient.    Physical Exam  Vitals and nursing note reviewed.   Constitutional:       General: He is not in acute distress.     Appearance: Normal appearance. He is ill-appearing.   HENT:      Head: Normocephalic and atraumatic.      Right Ear: A middle ear effusion is present. Tympanic membrane is erythematous.      Left Ear: A middle ear effusion is present. Tympanic membrane is erythematous.      Nose: Mucosal edema, congestion and rhinorrhea present. Rhinorrhea is purulent.      Right Turbinates: Swollen.      Left Turbinates: Swollen.      Right Sinus: Maxillary sinus tenderness present.      Left Sinus: Maxillary sinus tenderness present.      Mouth/Throat:      Lips: Pink.      Mouth: Mucous membranes are moist.      Pharynx: Uvula midline. Posterior oropharyngeal erythema and postnasal drip present.   Cardiovascular:      Rate and Rhythm: Normal rate and regular rhythm.      Pulses: Normal pulses. "      Heart sounds: Normal heart sounds.   Pulmonary:      Effort: Pulmonary effort is normal.      Breath sounds: Normal breath sounds and air entry. No decreased breath sounds or wheezing.   Musculoskeletal:      Cervical back: Normal range of motion and neck supple.   Skin:     General: Skin is warm and dry.      Capillary Refill: Capillary refill takes less than 2 seconds.   Neurological:      Mental Status: He is alert and oriented to person, place, and time.   Psychiatric:         Behavior: Behavior normal.         Procedures    Point of Care Test & Imaging Results from this visit  Results for orders placed or performed in visit on 04/13/25   POCT SPOTFIRE R/ST Panel Mini w/Strep A ([employer] [facility]) manually resulted   Result Value Ref Range    POC Group A Strep, PCR Negative Negative    POC Respiratory Syncytial Virus PCR Negative Negative    POC Influenza A Virus PCR Negative Negative    POC Influenza B Virus PCR Negative Negative    POC Human Rhinovirus PCR Negative Negative      Imaging  No results found.    Cardiology, Vascular, and Other Imaging  No other imaging results found for the past 2 days      Diagnostic study results (if any) were reviewed by JAQUELINE Cabral.    Assessment/Plan   Allergies, medications, history, and pertinent labs/EKGs/Imaging reviewed by JAQUELINE Cabral.     Medical Decision Making  Risks, benefits, and alternatives of the medications and treatment plan prescribed today were discussed, and patient expressed understanding. Plan follow up as discussed or as needed if any worsening symptoms or change in condition. Reinforced red flags including (but not limited to): severe or worsening pain; difficulty swallowing; stiff neck; shortness of breath; coughing or vomiting blood; chest pain; and new or increased fever are indications to go to the Emergency Department.  At time of discharge patient was clinically well-appearing and HDS for outpatient management. The  patient and/or family was educated regarding diagnosis, supportive care, OTC and Rx medications. The patient and/or family was given the opportunity to ask questions prior to discharge.  They verbalized understanding of my discussion of the plans for treatment, expected course, indications to return to  or seek further evaluation in ED, and the need for timely follow up as directed.   They were provided with a work/school excuse if requested. The after-visit summary was given to the patient and care instructions were reviewed with the patient. All questions were answered and the patient verbalized understanding of the plan of care for today.  Plan:  Recent visit notes reviewed  Meds as above  Increase clear fluids  Pcp follow up this week if not improving or worsening  ER visit anytime 24/7 for acute worsening or changing condition      Orders and Diagnoses  Diagnoses and all orders for this visit:  Sore throat  -     POCT SPOTFIRE R/ST Panel Mini w/Strep A ([facility]) manually resulted  -     predniSONE (Deltasone) 20 mg tablet; Take 1 tablet (20 mg) by mouth once daily for 5 days.  -     brompheniramine-pseudoeph-DM 2-30-10 mg/5 mL syrup; Take 10 mL by mouth 4 times a day as needed for cough or congestion for up to 10 days.  -     azithromycin (Zithromax) 250 mg tablet; Take 2 tablets (500 mg) by mouth once daily for 1 day, THEN 1 tablet (250 mg) once daily for 4 days.  Non-recurrent acute suppurative otitis media of right ear without spontaneous rupture of tympanic membrane      Medical Admin Record      Patient disposition: Home    Electronically signed by JAQUELINE Cabral  5:21 PM

## 2025-04-13 NOTE — PATIENT INSTRUCTIONS
Take all medications as ordered.  Increase fluid intake and rest.  Return with any new or worsening symptoms. Follow up with PCP as needed.   Proceed to the nearest ER with any chest pain, shortness of breath, difficulty swallowing or breathing, worsening pain.

## 2025-04-24 ENCOUNTER — TELEPHONE (OUTPATIENT)
Dept: ORTHOPEDIC SURGERY | Facility: CLINIC | Age: 25
End: 2025-04-24
Payer: COMMERCIAL

## 2025-04-24 NOTE — TELEPHONE ENCOUNTER
I spoke with patient and let him know that Dr. PEPE Diehl states that it will take time to get the full effects of the injection. Patient states that's that his range of motion has improved, but the weight bearing issue has not changed at this time. I informed him that he is welcome to come in if he would like and he states the he will wait a few days longer before making a decision.

## 2025-07-18 ENCOUNTER — APPOINTMENT (OUTPATIENT)
Dept: ORTHOPEDIC SURGERY | Facility: CLINIC | Age: 25
End: 2025-07-18
Payer: COMMERCIAL

## 2025-08-08 ENCOUNTER — APPOINTMENT (OUTPATIENT)
Dept: ORTHOPEDIC SURGERY | Facility: CLINIC | Age: 25
End: 2025-08-08
Payer: COMMERCIAL

## 2025-08-08 DIAGNOSIS — M67.431 GANGLION OF RIGHT WRIST: ICD-10-CM

## 2025-08-08 NOTE — PROGRESS NOTES
History of Present Illness:  Patient presenting with complaints of right wrist pain.  History of radiocarpal injection 3 months ago with some improvement in pain but still having persistent pain with weightbearing and hyperextension movements.  Again denies injury    Patient has history of left wrist  Ossicle excision in May of last year.      Review of Systems   GENERAL: Negative for malaise, significant weight loss, fever  MUSCULOSKELETAL: see HPI  NEURO:  Negative    The patient's past medical history, family history, social history, and review of systems were reviewed. History is otherwise negative except as stated in the HPI.    Physical Examination:  General: Alert and oriented to person, place, and time.  No acute distress and breathing comfortably: Pleasant and cooperative with examination.  HEENT: Head is normocephalic and atraumatic.  Neck: Supple, no visible swelling.  Cardiovascular: No palpable tachycardia  Lungs: No audible wheezing or labored breathing  Abdomen: Nondistended.    On musculoskeletal examination,   There is tenderness to palpation of the right radiocarpal joint.  Flexion and extension of the right wrist is limited by pain.  Sensation intact to light touch.  AIN PIN intact.  Intrinsic and extrinsic strength 5/5.  Thenar strength 5/5.    Imaging:  Radiographic notes: X-ray 3 views of the right wrist reviewed.  Reveal no acute osseous abnormality.    Procedures      Assessment:  Patient with right radiocarpal pain.  Underwent radiocarpal injection 3 months ago with some improvement in pain but persistent pain with wrist extension with loading activities.  Based on this we will proceed with MRI.  He has failed on conservative measures in the form of bracing anti-inflammatories and injection.    Plan:  MRI of the right wrist  Follow-up to review MRI        Sonia Dowd MD

## (undated) DEVICE — CORD, FORCEP, BIPOLAR, DISP

## (undated) DEVICE — GLOVE, SURGICAL, PROTEXIS PI , 6.5, PF, LF

## (undated) DEVICE — ADHESIVE, SKIN, LIQUIBAND EXCEED

## (undated) DEVICE — STRAP, VELCRO, BODY, 4 X 60IN, NS

## (undated) DEVICE — PREP, SCRUB, SKIN, FOAM, HIBICLENS, 4 OZ

## (undated) DEVICE — GLOVE, SURGICAL, PROTEXIS PI BLUE W/NEUTHERA, 6.5, PF, LF

## (undated) DEVICE — SUTURE, VICRYL, 2-0, 27 IN, BR/SH 27, VIOLET

## (undated) DEVICE — PADDING, CAST, SPECIALIST, 3 IN X 4 YD, STERILE

## (undated) DEVICE — TOWEL PACK 10-PK

## (undated) DEVICE — SLING, ARM, W/LEATHERETTE PAD, MEDIUM

## (undated) DEVICE — BANDAGE, COFLEX, 4 X 5 YDS, FOAM TAN, STERILE, LF

## (undated) DEVICE — CUFF, TOURNIQUET, DUAL PORT, SNG BLADDER, 18 IN, PLC

## (undated) DEVICE — GOWN, SURGICAL, ROYAL SILK, LG, STERILE

## (undated) DEVICE — MANIFOLD, 4 PORT NEPTUNE STANDARD

## (undated) DEVICE — STRAP, ARM BOARD, 32 X 1.5

## (undated) DEVICE — SUTURE, MONOCRYL, 4-0, 27 IN, PS-2, UNDYED

## (undated) DEVICE — TRAY, DRY PREP, PREMIUM

## (undated) DEVICE — NEEDLE, SAFETY, 25 GA X 1.5 IN

## (undated) DEVICE — SYRINGE, 10 CC, LUER LOCK

## (undated) DEVICE — Device

## (undated) DEVICE — SPLINT, SAFETY, PRE-CUT, 3 X 12 IN

## (undated) DEVICE — SYRINGE, 60 CC, IRRIGATION, BULB, CONTRO-BULB, PAPER POUCH